# Patient Record
Sex: FEMALE | Race: WHITE | NOT HISPANIC OR LATINO | Employment: OTHER
[De-identification: names, ages, dates, MRNs, and addresses within clinical notes are randomized per-mention and may not be internally consistent; named-entity substitution may affect disease eponyms.]

---

## 2017-06-03 ENCOUNTER — HEALTH MAINTENANCE LETTER (OUTPATIENT)
Age: 47
End: 2017-06-03

## 2017-08-22 ENCOUNTER — OFFICE VISIT (OUTPATIENT)
Dept: FAMILY MEDICINE | Facility: CLINIC | Age: 47
End: 2017-08-22
Payer: COMMERCIAL

## 2017-08-22 VITALS
WEIGHT: 121.6 LBS | HEIGHT: 65 IN | OXYGEN SATURATION: 100 % | HEART RATE: 91 BPM | DIASTOLIC BLOOD PRESSURE: 90 MMHG | SYSTOLIC BLOOD PRESSURE: 146 MMHG | BODY MASS INDEX: 20.26 KG/M2 | TEMPERATURE: 97.6 F

## 2017-08-22 DIAGNOSIS — L65.9 HAIR LOSS: ICD-10-CM

## 2017-08-22 DIAGNOSIS — E03.4 HYPOTHYROIDISM DUE TO ACQUIRED ATROPHY OF THYROID: Primary | ICD-10-CM

## 2017-08-22 DIAGNOSIS — R03.0 ELEVATED BLOOD PRESSURE READING WITHOUT DIAGNOSIS OF HYPERTENSION: ICD-10-CM

## 2017-08-22 DIAGNOSIS — D50.0 IRON DEFICIENCY ANEMIA DUE TO CHRONIC BLOOD LOSS: ICD-10-CM

## 2017-08-22 LAB
ERYTHROCYTE [DISTWIDTH] IN BLOOD BY AUTOMATED COUNT: 11.8 % (ref 10–15)
HCT VFR BLD AUTO: 39.9 % (ref 35–47)
HGB BLD-MCNC: 13.5 G/DL (ref 11.7–15.7)
MCH RBC QN AUTO: 32.8 PG (ref 26.5–33)
MCHC RBC AUTO-ENTMCNC: 33.8 G/DL (ref 31.5–36.5)
MCV RBC AUTO: 97 FL (ref 78–100)
PLATELET # BLD AUTO: 244 10E9/L (ref 150–450)
RBC # BLD AUTO: 4.11 10E12/L (ref 3.8–5.2)
WBC # BLD AUTO: 5.2 10E9/L (ref 4–11)

## 2017-08-22 PROCEDURE — 36415 COLL VENOUS BLD VENIPUNCTURE: CPT | Performed by: INTERNAL MEDICINE

## 2017-08-22 PROCEDURE — 83540 ASSAY OF IRON: CPT | Performed by: INTERNAL MEDICINE

## 2017-08-22 PROCEDURE — 99203 OFFICE O/P NEW LOW 30 MIN: CPT | Performed by: INTERNAL MEDICINE

## 2017-08-22 PROCEDURE — 85027 COMPLETE CBC AUTOMATED: CPT | Performed by: INTERNAL MEDICINE

## 2017-08-22 PROCEDURE — 83550 IRON BINDING TEST: CPT | Performed by: INTERNAL MEDICINE

## 2017-08-22 RX ORDER — LEVOTHYROXINE SODIUM 137 UG/1
TABLET ORAL
Refills: 0 | COMMUNITY
Start: 2017-06-28 | End: 2023-02-23

## 2017-08-22 NOTE — MR AVS SNAPSHOT
"              After Visit Summary   8/22/2017    Rebekah Lacey    MRN: [id number]           Patient Information     Date Of Birth          1970        Visit Information        Provider Department      8/22/2017 2:30 PM Adrien Costello MD [facility]        Today's Diagnoses     Hypothyroidism due to acquired atrophy of thyroid    -  1    Hair loss        Elevated blood pressure reading without diagnosis of hypertension        Iron deficiency anemia due to chronic blood loss           Follow-ups after your visit        Your next 10 appointments already scheduled     Sep 22, 2017  1:20 PM CDT   PHYSICAL with Hawa Joiner MD   [facility]    [address] [address] [phone]              Who to contact     If you have questions or need follow up information about today's clinic visit or your schedule please contact [facility] directly at [phone].  Normal or non-critical lab and imaging results will be communicated to you by MyChart, letter or phone within 4 business days after the clinic has received the results. If you do not hear from us within 7 days, please contact the clinic through [employer] or phone. If you have a critical or abnormal lab result, we will notify you by phone as soon as possible.  Submit refill requests through [employer] or call your pharmacy and they will forward the refill request to us. Please allow 3 business days for your refill to be completed.          Additional Information About Your Visit        [employer] Information     [employer] lets you send messages to your doctor, view your test results, renew your prescriptions, schedule appointments and more. To sign up, go to [email or web address] . Click on \"Log in\" on the left side of the screen, which will take you to the Welcome page. Then click on \"Sign up Now\" on the right side of the page.     You will be asked to " "enter the access code listed below, as well as some personal information. Please follow the directions to create your username and password.     Your access code is: [email or web address]  Expires: 2017  2:25 PM     Your access code will  in 90 days. If you need help or a new code, please call your [facility] clinic or [phone].        Care EveryWhere ID     This is your Care EveryWhere ID. This could be used by other organizations to access your [facility] medical records  [id number]        Your Vitals Were     Pulse Temperature Height Last Period Pulse Oximetry Breastfeeding?    91 97.6  F (36.4  C) (Oral) 5' 5\" (1.651 m) 2017 100% No    BMI (Body Mass Index)                   20.24 kg/m2            Blood Pressure from Last 3 Encounters:   17 146/90    Weight from Last 3 Encounters:   17 121 lb 9.6 oz (55.2 kg)              We Performed the Following     CBC with platelets     Iron and iron binding capacity        Primary Care Provider Office Phone # Fax #    Adrine Hathaway [phone]       [facility] [address] [address]        Equal Access to Services     [facility]: Hadii aad ku hadasho Soomaali, waaxda luqadaha, qaybta kaalmada adeegyada, waxay idiin haygauravn leslie rivero . So [facility] [phone].    ATENCIÓN: Si habla español, tiene a medina disposición servicios gratuitos de asistencia lingüística. Llame al [phone].    We comply with applicable federal civil rights laws and [place] laws. We do not discriminate on the basis of race, color, national origin, age, disability sex, sexual orientation or gender identity.            Thank you!     Thank you for choosing [facility]  for your care. Our goal is always to provide you with excellent care. Hearing back from our patients is one way we can continue to improve our services. Please take a few minutes to complete the written survey that you may receive in the mail " after your visit with us. Thank you!             Your Updated Medication List - Protect others around you: Learn how to safely use, store and throw away your medicines at www.disposemymeds.org.          This list is accurate as of: 8/22/17 11:59 PM.  Always use your most recent med list.                   Brand Name Dispense Instructions for use Diagnosis    levothyroxine 137 MCG tablet    SYNTHROID/LEVOTHROID

## 2017-08-22 NOTE — NURSING NOTE
"Chief Complaint   Patient presents with     Establish Care       Initial BP (!) 152/97 (BP Location: Right arm, Patient Position: Chair, Cuff Size: Adult Regular)  Pulse 91  Temp 97.6  F (36.4  C) (Oral)  Ht 5' 5\" (1.651 m)  Wt 121 lb 9.6 oz (55.2 kg)  LMP 08/17/2017  SpO2 100%  Breastfeeding? No  BMI 20.24 kg/m2 Estimated body mass index is 20.24 kg/(m^2) as calculated from the following:    Height as of this encounter: 5' 5\" (1.651 m).    Weight as of this encounter: 121 lb 9.6 oz (55.2 kg).  Medication Reconciliation: complete.  Cyndi Harris CMA    "

## 2017-08-22 NOTE — PROGRESS NOTES
SUBJECTIVE:   Rebekah Lacey is a 47 year old female who is a former [facility] patient, presents to clinic today to establish care and for the following health issues:    Patient has history of hypothyroidism and complains of hair loss. She states one year ago her  noticed her hair was thinning so she saw her endocrinologist. At that appointment thyroid was low and medications were adjusted and symptoms improved. She states hair has begun thinning again. All tests were WNL at recent endocrinologist visit 2 weeks ago. In regards to exercises regimen, patient runs daily with her dog for 5 miles, approximately 50 minutes. Denies craving ice cubes.      Patient also complains of swollen anterior cervical lymph nodes bilaterally in the summer and marley. She reports using Claritin and Flonase when symptoms are severe, but does not use medications routinely. She notes nasal congestion during the day. Denies nasal congestion at night.    Patient does not have a history of hypertension, however, she notes an increase in pressure as her daughter is deciding between online classes and [facility].     Problem list and histories reviewed & adjusted, as indicated.  Additional history: as documented    Current Outpatient Prescriptions   Medication Sig Dispense Refill     levothyroxine (SYNTHROID/LEVOTHROID) 137 MCG tablet   0     Allergies   Allergen Reactions     Amoxicillin Hives     Clindamycin Hives       Reviewed and updated as needed this visit by clinical staff  Tobacco  Allergies  Meds  Soc Hx      Reviewed and updated as needed this visit by Provider         ROS:  Constitutional, HEENT, cardiovascular, pulmonary, gi and gu systems are negative, except as otherwise noted.    This document serves as a record of the services and decisions personally performed and made by Adrien Costello MD. It was created on his/her behalf by Sylvia Meng, a trained medical scribe. The creation of this  "document is based the provider's statements to the medical scribe.  Scribdez Meng 2:34 PM, August 22, 2017     OBJECTIVE:   BP (!) 150/96  Pulse 91  Temp 97.6  F (36.4  C) (Oral)  Ht 1.651 m (5' 5\")  Wt 55.2 kg (121 lb 9.6 oz)  LMP 08/17/2017  SpO2 100%  Breastfeeding? No  BMI 20.24 kg/m2  Body mass index is 20.24 kg/(m^2).   HEENT: throat was mildly erythematous   Neck was supple without adenopathy or thyromegaly her carotids were normal without bruits  Chest clear to auscultation and percussion  Cardiovascular S1 and S2 are physiologic without murmurs or gallops.  Abdomen bowel sounds were normal.  There is no palpable mass or organomegaly  Extremities nontender without any edema  Pulses pedal pulses are as described otherwise his pulses are bilaterally symmetrical throughout without bruits  Skin without significant abnormality     ASSESSMENT/PLAN:   1. Hypothyroidism due to acquired atrophy of thyroid  See #2    2. Hair loss  Labs recently done at endocrinologist.    3. Elevated blood pressure reading without diagnosis of hypertension  Current elevated blood pressure due to increased acute stress. Monitor blood pressure BID for the next week and report findings.  4. Iron deficiency anemia due to chronic blood loss  Eliminating iron deficiency as cause of hair loss  - CBC with platelets  - Iron and iron binding capacity    Being Flonase 2 sprays each nostril QD in the AM.     Adrien Costello MD  [facility]    The information in this document, created by the medical scribe for me, accurately reflects the services I personally performed and the decisions made by me. I have reviewed and approved this document for accuracy prior to leaving the patient care area.  Adrien Costello MD  2:33 PM, 08/22/17     "

## 2017-08-23 LAB
IRON SATN MFR SERPL: 32 % (ref 15–46)
IRON SERPL-MCNC: 125 UG/DL (ref 35–180)
TIBC SERPL-MCNC: 391 UG/DL (ref 240–430)

## 2017-08-24 NOTE — PROGRESS NOTES
I called the patient and informed of results. The following changes were made to treatment:   Recent thyroid evaluation was normal, blood cell counts, and iron stores are also normal.  The possibilities related to her perimenopausal hair loss would be related to her underlying menopausal state and associated hair loss and woman versus traction alopecia or telogen  Effluvium associated with her recent stress.  Inquired about recent blood pressure checks which she is beginning to do.  We'll reevaluate here in 3 months depending.    Patient voices understanding and will contact me with any further questions.     Adrien Costello MD

## 2017-09-16 ENCOUNTER — HEALTH MAINTENANCE LETTER (OUTPATIENT)
Age: 47
End: 2017-09-16

## 2017-11-29 ENCOUNTER — OFFICE VISIT (OUTPATIENT)
Dept: OBGYN | Facility: CLINIC | Age: 47
End: 2017-11-29
Payer: COMMERCIAL

## 2017-11-29 ENCOUNTER — RADIANT APPOINTMENT (OUTPATIENT)
Dept: MAMMOGRAPHY | Facility: CLINIC | Age: 47
End: 2017-11-29
Payer: COMMERCIAL

## 2017-11-29 VITALS
BODY MASS INDEX: 21.09 KG/M2 | WEIGHT: 126.6 LBS | SYSTOLIC BLOOD PRESSURE: 124 MMHG | DIASTOLIC BLOOD PRESSURE: 82 MMHG | HEART RATE: 72 BPM | HEIGHT: 65 IN

## 2017-11-29 DIAGNOSIS — E03.4 HYPOTHYROIDISM DUE TO ACQUIRED ATROPHY OF THYROID: ICD-10-CM

## 2017-11-29 DIAGNOSIS — Z01.419 ENCOUNTER FOR GYNECOLOGICAL EXAMINATION WITHOUT ABNORMAL FINDING: Primary | ICD-10-CM

## 2017-11-29 DIAGNOSIS — N60.12 FIBROCYSTIC CHANGES OF LEFT BREAST: ICD-10-CM

## 2017-11-29 DIAGNOSIS — Z12.31 VISIT FOR SCREENING MAMMOGRAM: ICD-10-CM

## 2017-11-29 DIAGNOSIS — Z23 NEED FOR PROPHYLACTIC VACCINATION AND INOCULATION AGAINST INFLUENZA: ICD-10-CM

## 2017-11-29 PROCEDURE — 87624 HPV HI-RISK TYP POOLED RSLT: CPT | Performed by: OBSTETRICS & GYNECOLOGY

## 2017-11-29 PROCEDURE — 90686 IIV4 VACC NO PRSV 0.5 ML IM: CPT | Performed by: OBSTETRICS & GYNECOLOGY

## 2017-11-29 PROCEDURE — 90471 IMMUNIZATION ADMIN: CPT | Performed by: OBSTETRICS & GYNECOLOGY

## 2017-11-29 PROCEDURE — 99386 PREV VISIT NEW AGE 40-64: CPT | Mod: 25 | Performed by: OBSTETRICS & GYNECOLOGY

## 2017-11-29 PROCEDURE — G0202 SCR MAMMO BI INCL CAD: HCPCS | Mod: TC

## 2017-11-29 PROCEDURE — G0145 SCR C/V CYTO,THINLAYER,RESCR: HCPCS | Performed by: OBSTETRICS & GYNECOLOGY

## 2017-11-29 ASSESSMENT — PATIENT HEALTH QUESTIONNAIRE - PHQ9
5. POOR APPETITE OR OVEREATING: NOT AT ALL
SUM OF ALL RESPONSES TO PHQ QUESTIONS 1-9: 0

## 2017-11-29 ASSESSMENT — ANXIETY QUESTIONNAIRES
2. NOT BEING ABLE TO STOP OR CONTROL WORRYING: NOT AT ALL
GAD7 TOTAL SCORE: 0
1. FEELING NERVOUS, ANXIOUS, OR ON EDGE: NOT AT ALL
5. BEING SO RESTLESS THAT IT IS HARD TO SIT STILL: NOT AT ALL
3. WORRYING TOO MUCH ABOUT DIFFERENT THINGS: NOT AT ALL
7. FEELING AFRAID AS IF SOMETHING AWFUL MIGHT HAPPEN: NOT AT ALL
6. BECOMING EASILY ANNOYED OR IRRITABLE: NOT AT ALL
IF YOU CHECKED OFF ANY PROBLEMS ON THIS QUESTIONNAIRE, HOW DIFFICULT HAVE THESE PROBLEMS MADE IT FOR YOU TO DO YOUR WORK, TAKE CARE OF THINGS AT HOME, OR GET ALONG WITH OTHER PEOPLE: NOT DIFFICULT AT ALL

## 2017-11-29 NOTE — MR AVS SNAPSHOT
After Visit Summary   11/29/2017    Rebekah Lacey    MRN: [id number]           Patient Information     Date Of Birth          1970        Visit Information        Provider Department      11/29/2017 8:40 AM Hawa Joiner MD [facility]        Today's Diagnoses     Encounter for gynecological examination without abnormal finding    -  1    Hypothyroidism due to acquired atrophy of thyroid        Fibrocystic changes of left breast        Need for prophylactic vaccination and inoculation against influenza           Follow-ups after your visit        Who to contact     If you have questions or need follow up information about today's clinic visit or your schedule please contact [facility] directly at [phone].  Normal or non-critical lab and imaging results will be communicated to you by MyChart, letter or phone within 4 business days after the clinic has received the results. If you do not hear from us within 7 days, please contact the clinic through [employer] or phone. If you have a critical or abnormal lab result, we will notify you by phone as soon as possible.  Submit refill requests through [employer] [facility] [facility] or call your pharmacy and they will forward the refill request to us. Please allow 3 business days for your refill to be completed.          Additional Information About Your Visit        MyChart Information     [facility] [employer] [facility] gives you secure access to your electronic health record. If you see a primary care provider, you can also send messages to your care team and make appointments. If you have questions, please call your primary care clinic.  If you do not have a primary care provider, please call [phone] and they will assist you.        Care EveryWhere ID     This is your Care EveryWhere ID. This could be used by other organizations to access your [facility] medical records  [id number]        Your Vitals Were     Pulse Height Last Period BMI (Body  "Mass Index)          72 5' 5\" (1.651 m) 11/20/2017 (Exact Date) 21.07 kg/m2         Blood Pressure from Last 3 Encounters:   11/29/17 124/82   08/22/17 146/90    Weight from Last 3 Encounters:   11/29/17 126 lb 9.6 oz (57.4 kg)   08/22/17 121 lb 9.6 oz (55.2 kg)              We Performed the Following     FLU VAC, SPLIT VIRUS IM > 3 YO (QUADRIVALENT) [23199]     HPV High Risk Types DNA Cervical     Pap imaged thin layer screen with HPV - recommended age 30 - 65     Vaccine Administration, Initial [23429]        Primary Care Provider Office Phone # Fax #    Adrien Costello [phone] [address] [address]        Equal Access to Services     [facility] : Hadii aline locke hadasho Soomaali, waaxda luqadaha, qaybta kaalmada calos, elvin rivero . So [facility] [phone].    ATENCIÓN: Si habla español, tiene a medina disposición servicios gratuitos de asistencia lingüística. Brian al [phone].    We comply with applicable federal civil rights laws and [place] laws. We do not discriminate on the basis of race, color, national origin, age, disability, sex, sexual orientation, or gender identity.            Thank you!     Thank you for choosing [facility]  for your care. Our goal is always to provide you with excellent care. Hearing back from our patients is one way we can continue to improve our services. Please take a few minutes to complete the written survey that you may receive in the mail after your visit with us. Thank you!             Your Updated Medication List - Protect others around you: Learn how to safely use, store and throw away your medicines at www.disposemymeds.org.          This list is accurate as of: 11/29/17 11:59 PM.  Always use your most recent med list.                   Brand Name Dispense Instructions for use Diagnosis    CLARITIN PO           FLONASE ALLERGY RELIEF NA           levothyroxine 137 MCG " tablet    SYNTHROID/LEVOTHROID

## 2017-11-29 NOTE — PROGRESS NOTES

## 2017-11-29 NOTE — PROGRESS NOTES
Rebekah is a 47 year old No obstetric history on file. female who presents for annual exam.     Besides routine health maintenance, she has no other health concerns today .    HPI:  The patient's PCP is Adrien Costello MD.    Was seeing various ppl at [facility] ob/gyn for years and now hasn't been seen since 2014. Her PCP is part of  so decided to just get an ob/gyn through  as well to keep records more straight forward.  Had graves, ablation and now hypothyroid. Sees Dr. Rinaldi for that. Last year had a small bump in her dose but o/w has been fairly stable for years and not sx.  Periods are very regular for most part. Skipped dec 2016 but was traveling and stressed with dad's surgery. Then was 2 weeks late on her last one in November but again were traveling. No vasomotor or other perimenopausal sx at all.  Periods are about 4-5 days. 2 are heavy. Some clots, some waking at night. Changes tampon every 2 hours. Rare accidents. Tolerable. Older sis is 55 and just starting to have some sx.  Her  feels she's losing some hair and she feels it's definitely thinner too. Not a recent change but generally in the last few years. No spots of baldness just generally more thin.  Has a 13 and 11 y.o daughters and a 5 y.o son. Works for [employer].      GYNECOLOGIC HISTORY:    Patient's last menstrual period was 11/20/2017 (exact date).  Her current contraception method is: vasectomy.  She  reports that she has never smoked. She has never used smokeless tobacco.      Patient is sexually active.  STD testing offered?  Declined  Last PHQ-9 score on record =   PHQ-9 SCORE 11/29/2017   Total Score 0     Last GAD7 score on record =   JACQUELINE-7 SCORE 11/29/2017   Total Score 0     Alcohol Score = 1    HEALTH MAINTENANCE:  Cholesterol: (No results found for: CHOL -patient has done with pcp  Last Mammo: per patient 2014, Result: normal, Next Mammo: patient will schedule an apppointment   Pap: per patient 2014, wnl-unsure if had  "HPV  Colonoscopy:  NA, not due until age 49  Dexa: Never    Health maintenance updated:  yes    HISTORY:  Obstetric History       T3      L0     SAB1   TAB0   Ectopic0   Multiple0   Live Births0       # Outcome Date GA Lbr Samym/2nd Weight Sex Delivery Anes PTL Lv   4 Term 01/01/10    M       3 Term 06    F       2 Term 04    F       1 SAB                   Patient Active Problem List   Diagnosis     Hypothyroidism due to acquired atrophy of thyroid     Fibrocystic changes of left breast     Past Surgical History:   Procedure Laterality Date     breast lump removal       DILATION AND CURETTAGE  2011    x2      Social History   Substance Use Topics     Smoking status: Never Smoker     Smokeless tobacco: Never Used     Alcohol use Yes      Problem (# of Occurrences) Relation (Name,Age of Onset)    HEART DISEASE (1) Father    Hyperlipidemia (2) Mother, Father    Hypertension (3) Mother, Father, Brother            Current Outpatient Prescriptions   Medication Sig     Loratadine (CLARITIN PO)      Fluticasone Propionate (FLONASE ALLERGY RELIEF NA)      levothyroxine (SYNTHROID/LEVOTHROID) 137 MCG tablet      No current facility-administered medications for this visit.      Allergies   Allergen Reactions     Amoxicillin Hives     Clindamycin Hives       Past medical, surgical, social and family histories were reviewed and updated in EPIC.    ROS:   12 point review of systems negative other than symptoms noted below.  Endocrine: Loss of Hair    EXAM:  /82  Pulse 72  Ht 5' 5\" (1.651 m)  Wt 126 lb 9.6 oz (57.4 kg)  LMP 2017 (Exact Date)  BMI 21.07 kg/m2   BMI: Body mass index is 21.07 kg/(m^2).    PHYSICAL EXAM:  Constitutional:  Appearance: Well nourished, well developed, alert, in no acute distress  Neck:  Lymph Nodes:  No lymphadenopathy present    Thyroid:  Gland size normal, nontender, no nodules or masses present  on palpation  Chest:  Respiratory Effort:  " Breathing unlabored  Cardiovascular:    Heart: Auscultation:  Regular rate, normal rhythm, no murmurs present  Breasts: NORMAL RIGHT BREAST. THE LEFT BREAST IN THE UPPER OUTER QUADRANT HAS EXTENSIVE F.C CHANGES AND DENSITY THOUGH NO SPECIFIC LUMP  Gastrointestinal:   Abdominal Examination:  Abdomen nontender to palpation, tone normal without rigidity or guarding, no masses present, umbilicus without lesions   Liver and Spleen:  No hepatomegaly present, liver nontender to palpation    Hernias:  No hernias present  Lymphatic: Lymph Nodes:  No other lymphadenopathy present  Skin:  General Inspection:  No rashes present, no lesions present, no areas of  discoloration    Genitalia and Groin:  No rashes present, no lesions present, no areas of  discoloration, no masses present  Neurologic/Psychiatric:    Mental Status:  Oriented X3     Pelvic Exam:  External Genitalia:     Normal appearance for age, no discharge present, no tenderness present, no inflammatory lesions present, color normal  Vagina:     Normal vaginal vault without central or paravaginal defects, no discharge present, no inflammatory lesions present, no masses present  Bladder:     Nontender to palpation  Urethra:   Urethral Body:  Urethra palpation normal, urethra structural support normal   Urethral Meatus:  No erythema or lesions present  Cervix:     Appearance healthy, no lesions present, nontender to palpation, no bleeding present  Uterus:     Uterus: firm, normal sized and nontender, anteverted in position.   Adnexa:     No adnexal tenderness present, no adnexal masses present  Perineum:     Perineum within normal limits, no evidence of trauma, no rashes or skin lesions present  Anus:     Anus within normal limits, no hemorrhoids present  Inguinal Lymph Nodes:     No lymphadenopathy present  Pubic Hair:     Normal pubic hair distribution for age  Genitalia and Groin:     No rashes present, no lesions present, no areas of discoloration, no masses  present      COUNSELING:   Reviewed preventive health counseling, as reflected in patient instructions    BMI: Body mass index is 21.07 kg/(m^2).        ASSESSMENT:  47 year old female with satisfactory annual exam.    ICD-10-CM    1. Encounter for gynecological examination without abnormal finding Z01.419 Pap imaged thin layer screen with HPV - recommended age 30 - 65     HPV High Risk Types DNA Cervical   2. Hypothyroidism due to acquired atrophy of thyroid E03.4    3. Fibrocystic changes of left breast N60.12    4. Need for prophylactic vaccination and inoculation against influenza Z23 FLU VAC, SPLIT VIRUS IM > 3 YO (QUADRIVALENT) [90440]     Vaccine Administration, Initial [53892]       PLAN:  Pap and cotesting done today as last pap was 3 yrs ago and no records available so HPV status not certain  Continue to follow her thyroid with Dr. Rinaldi of [facility]  Doing her labs and primary care f/u with P[facility] who is now apparently part of the [facility] system. No recent fasting labs in system but patient states she's had them recently  Patient had a benign breast lump removed from the left in her late 20's. Can't remember what it was but excisional and not drainage. Still has a lot of nodularity in the upper left side. Able to get her in for a mammo today and encouraged to do a 3D. If that is normal no further follow up needed but could consider u/s of that side in the future if any changes  Flu shot today  Patient has heavy periods for 2 days or so. Discussed lysteda if event/travel/etc. Patient is open to idea and will call if/when would like to try an rx    Hawa Joiner MD

## 2017-11-29 NOTE — LETTER
December 5, 2017    Rebekah Lacey  [address]  [place] [facility] [address]    Dear Rebekah,  We are happy to inform you that your PAP smear result from 11/29/17 is normal.  We are now able to do a follow up test on PAP smears. The DNA test is for HPV (Human Papilloma Virus). Cervical cancer is closely linked with certain types of HPV. Your result showed no evidence of high risk HPV.  Therefore we recommend you return in 3 years for your next pap smear.  You will still need to return to the clinic every year for an annual exam and other preventive tests.  Please contact the clinic at [phone] with any questions.  Sincerely,    Hawa Joiner MD/roxana

## 2017-11-30 ASSESSMENT — ANXIETY QUESTIONNAIRES: GAD7 TOTAL SCORE: 0

## 2017-12-01 LAB
COPATH REPORT: NORMAL
PAP: NORMAL

## 2017-12-04 LAB
FINAL DIAGNOSIS: NORMAL
HPV HR 12 DNA CVX QL NAA+PROBE: NEGATIVE
HPV16 DNA SPEC QL NAA+PROBE: NEGATIVE
HPV18 DNA SPEC QL NAA+PROBE: NEGATIVE
SPECIMEN DESCRIPTION: NORMAL

## 2018-10-23 ENCOUNTER — OFFICE VISIT (OUTPATIENT)
Dept: FAMILY MEDICINE | Facility: CLINIC | Age: 48
End: 2018-10-23
Payer: COMMERCIAL

## 2018-10-23 VITALS
BODY MASS INDEX: 21.33 KG/M2 | DIASTOLIC BLOOD PRESSURE: 92 MMHG | OXYGEN SATURATION: 100 % | SYSTOLIC BLOOD PRESSURE: 140 MMHG | TEMPERATURE: 98.6 F | HEIGHT: 65 IN | HEART RATE: 72 BPM | WEIGHT: 128 LBS

## 2018-10-23 DIAGNOSIS — R05.8 POST-VIRAL COUGH SYNDROME: Primary | ICD-10-CM

## 2018-10-23 PROCEDURE — 99213 OFFICE O/P EST LOW 20 MIN: CPT | Performed by: NURSE PRACTITIONER

## 2018-10-23 NOTE — MR AVS SNAPSHOT
"              After Visit Summary   10/23/2018    Rebekah Lacey    MRN: [id number]           Patient Information     Date Of Birth          1970        Visit Information        Provider Department      10/23/2018 1:00 PM Марина Conway APRN CNP [facility]        Care Instructions    Push fluids  Begin mucinex 1200mg twice a day   And continue the flonase 2 sprays each nostril once a day          Follow-ups after your visit        Who to contact     If you have questions or need follow up information about today's clinic visit or your schedule please contact [facility] directly at [phone].  Normal or non-critical lab and imaging results will be communicated to you by MyChart, letter or phone within 4 business days after the clinic has received the results. If you do not hear from us within 7 days, please contact the clinic through [employer] or phone. If you have a critical or abnormal lab result, we will notify you by phone as soon as possible.  Submit refill requests through [employer] or call your pharmacy and they will forward the refill request to us. Please allow 3 business days for your refill to be completed.          Additional Information About Your Visit        MyChart Information     [employer] gives you secure access to your electronic health record. If you see a primary care provider, you can also send messages to your care team and make appointments. If you have questions, please call your primary care clinic.  If you do not have a primary care provider, please call [phone] and they will assist you.        Care EveryWhere ID     This is your Care EveryWhere ID. This could be used by other organizations to access your [facility] medical records  [id number]        Your Vitals Were     Pulse Temperature Height Pulse Oximetry Breastfeeding? BMI (Body Mass Index)    72 98.6  F (37  C) (Oral) 5' 5\" (1.651 m) 100% No 21.3 kg/m2       Blood Pressure from Last 3 Encounters: "   10/23/18 (!) 140/92   11/29/17 124/82   08/22/17 146/90    Weight from Last 3 Encounters:   10/23/18 128 lb (58.1 kg)   11/29/17 126 lb 9.6 oz (57.4 kg)   08/22/17 121 lb 9.6 oz (55.2 kg)              Today, you had the following     No orders found for display       Primary Care Provider Office Phone # Fax #    Adrien Costello [phone] [address] [facility] [address]  [facility] [address]        Equal Access to Services     [facility]: Hadii aad ku hadasho Soelizabethali, waaxda luqadaha, qaybta kaalmada adelorettayada, elvin rivero . So [facility] [phone].    ATENCIÓN: Si habla español, tiene a medina disposición servicios gratuitos de asistencia lingüística. Llame al [phone].    We comply with applicable federal civil rights laws and [place] laws. We do not discriminate on the basis of race, color, national origin, age, disability, sex, sexual orientation, or gender identity.            Thank you!     Thank you for choosing [facility]  for your care. Our goal is always to provide you with excellent care. Hearing back from our patients is one way we can continue to improve our services. Please take a few minutes to complete the written survey that you may receive in the mail after your visit with us. Thank you!             Your Updated Medication List - Protect others around you: Learn how to safely use, store and throw away your medicines at www.disposemymeds.org.          This list is accurate as of 10/23/18  1:23 PM.  Always use your most recent med list.                   Brand Name Dispense Instructions for use Diagnosis    CLARITIN PO           FLONASE ALLERGY RELIEF NA           levothyroxine 137 MCG tablet    SYNTHROID/LEVOTHROID

## 2018-10-23 NOTE — PATIENT INSTRUCTIONS
Push fluids  Begin mucinex 1200mg twice a day   And continue the flonase 2 sprays each nostril once a day

## 2018-10-23 NOTE — NURSING NOTE
"Chief Complaint   Patient presents with     Cough       Initial Pulse 72  Temp 98.6  F (37  C) (Oral)  Ht 5' 5\" (1.651 m)  Wt 128 lb (58.1 kg)  SpO2 100%  Breastfeeding? No  BMI 21.3 kg/m2 Estimated body mass index is 21.3 kg/(m^2) as calculated from the following:    Height as of this encounter: 5' 5\" (1.651 m).    Weight as of this encounter: 128 lb (58.1 kg).  BP completed using cuff size: regular    Health Maintenance Due   Topic Date Due     HIV SCREEN (SYSTEM ASSIGNED)  03/09/1987     LIPID SCREEN Q5 YR FEMALE (SYSTEM ASSIGNED)  03/09/2014     TETANUS IMMUNIZATION (SYSTEM ASSIGNED)  01/01/2015     INFLUENZA VACCINE (1) 09/01/2018         Shannan Blanc MA 10/23/18        "

## 2018-10-23 NOTE — PROGRESS NOTES
SUBJECTIVE:   Rebekah Lacey is a 48 year old female who presents to clinic today for the following health issues:      Acute Illness   Acute illness concerns: cough   Onset: x 3 weeks but getting better;  Cough a bit worse this morning so  sent her in to get checked    Fever: no    Chills/Sweats: no    Headache (location?): no     Sinus Pressure:no    Conjunctivitis:  No, seasonal allergies.     Ear Pain: no    Rhinorrhea: YES    Congestion: no    Sore Throat: YES- due to coughing.      Cough: YES-productive of yellow sputum    Wheeze: YES- in the mornings.     Decreased Appetite: no    Nausea: no    Vomiting: no    Diarrhea:  no    Dysuria/Freq.: no    Fatigue/Achiness: no     Sick/Strep Exposure: no      Therapies Tried and outcome: Honey, post nasal drip.   Just started using some flonase 4 days ago    Problem list and histories reviewed & adjusted, as indicated.  Additional history: as documented    Patient Active Problem List   Diagnosis     Hypothyroidism due to acquired atrophy of thyroid     Fibrocystic changes of left breast     Past Surgical History:   Procedure Laterality Date     breast lump removal  1997     DILATION AND CURETTAGE  2011    x2       Social History   Substance Use Topics     Smoking status: Never Smoker     Smokeless tobacco: Never Used     Alcohol use Yes     Family History   Problem Relation Age of Onset     Hyperlipidemia Mother      Hypertension Mother      Hyperlipidemia Father      Hypertension Father      HEART DISEASE Father      Hypertension Brother          Current Outpatient Prescriptions   Medication Sig Dispense Refill     Fluticasone Propionate (FLONASE ALLERGY RELIEF NA)        levothyroxine (SYNTHROID/LEVOTHROID) 137 MCG tablet   0     Loratadine (CLARITIN PO)        Allergies   Allergen Reactions     Amoxicillin Hives     Clindamycin Hives       Reviewed and updated as needed this visit by clinical staff       Reviewed and updated as needed this visit by  "Provider         ROS:  Constitutional, HEENT, cardiovascular, pulmonary, gi and gu systems are negative, except as otherwise noted.    OBJECTIVE:     BP (!) 140/92  Pulse 72  Temp 98.6  F (37  C) (Oral)  Ht 5' 5\" (1.651 m)  Wt 128 lb (58.1 kg)  SpO2 100%  Breastfeeding? No  BMI 21.3 kg/m2  Body mass index is 21.3 kg/(m^2).  GENERAL: healthy, alert and no distress  EYES: Eyes grossly normal to inspection, PERRL and conjunctivae and sclerae normal  HENT: ear canals and TM's normal, nose and mouth without ulcers or lesions  NECK: no adenopathy, no asymmetry, masses, or scars and thyroid normal to palpation  RESP: lungs clear to auscultation - no rales, rhonchi or wheezes  CV: regular rate and rhythm, normal S1 S2, no S3 or S4, no murmur, click or rub, no peripheral edema and peripheral pulses strong    Diagnostic Test Results:  none     ASSESSMENT/PLAN:         ICD-10-CM    1. Post-viral cough syndrome R05        Patient Instructions   Push fluids  Begin mucinex 1200mg twice a day   And continue the flonase 2 sprays each nostril once a day      JOSÉ [facility]  "

## 2018-12-18 ENCOUNTER — OFFICE VISIT (OUTPATIENT)
Dept: FAMILY MEDICINE | Facility: CLINIC | Age: 48
End: 2018-12-18
Payer: COMMERCIAL

## 2018-12-18 VITALS
HEART RATE: 72 BPM | OXYGEN SATURATION: 100 % | BODY MASS INDEX: 20.83 KG/M2 | SYSTOLIC BLOOD PRESSURE: 146 MMHG | DIASTOLIC BLOOD PRESSURE: 96 MMHG | WEIGHT: 125 LBS | HEIGHT: 65 IN | TEMPERATURE: 97.8 F

## 2018-12-18 DIAGNOSIS — Z00.00 ROUTINE GENERAL MEDICAL EXAMINATION AT A HEALTH CARE FACILITY: ICD-10-CM

## 2018-12-18 DIAGNOSIS — Z23 NEED FOR PROPHYLACTIC VACCINATION AND INOCULATION AGAINST INFLUENZA: ICD-10-CM

## 2018-12-18 DIAGNOSIS — N60.12 FIBROCYSTIC CHANGES OF LEFT BREAST: ICD-10-CM

## 2018-12-18 DIAGNOSIS — I10 ESSENTIAL HYPERTENSION: ICD-10-CM

## 2018-12-18 DIAGNOSIS — E03.4 HYPOTHYROIDISM DUE TO ACQUIRED ATROPHY OF THYROID: Primary | ICD-10-CM

## 2018-12-18 DIAGNOSIS — Z23 NEED FOR TD VACCINE: ICD-10-CM

## 2018-12-18 DIAGNOSIS — Z23 NEED FOR INFLUENZA VACCINATION: ICD-10-CM

## 2018-12-18 PROCEDURE — 99396 PREV VISIT EST AGE 40-64: CPT | Mod: 25 | Performed by: INTERNAL MEDICINE

## 2018-12-18 PROCEDURE — 90714 TD VACC NO PRESV 7 YRS+ IM: CPT | Performed by: INTERNAL MEDICINE

## 2018-12-18 PROCEDURE — 90472 IMMUNIZATION ADMIN EACH ADD: CPT | Performed by: INTERNAL MEDICINE

## 2018-12-18 PROCEDURE — 90686 IIV4 VACC NO PRSV 0.5 ML IM: CPT | Performed by: INTERNAL MEDICINE

## 2018-12-18 PROCEDURE — 90471 IMMUNIZATION ADMIN: CPT | Performed by: INTERNAL MEDICINE

## 2018-12-18 RX ORDER — LISINOPRIL 10 MG/1
10 TABLET ORAL DAILY
Qty: 30 TABLET | Refills: 1 | Status: SHIPPED | OUTPATIENT
Start: 2018-12-18 | End: 2019-01-28

## 2018-12-18 ASSESSMENT — MIFFLIN-ST. JEOR: SCORE: 1192.88

## 2018-12-18 NOTE — PROGRESS NOTES
SUBJECTIVE:   Rebekah Lacey is a 48 year old female who presents to clinic today for the following health issues:    Patient presents to clinic for follow up to discuss her respiratory sx's that are still lingering with a cough in the morning particularly. She uses Flonase 2 sprays into each nostril BID. She is allergic to Mucinex.     Patient has been checking her BP and it has been 140/90. She is wondering if this is of concern. She exercises and watches her salt intake. She runs 5 miles a day. If she doesn't run, then she either does stairs, elliptical, or bike. Her weight has stayed relatively consistent. She has a 12 year old and 6 year old child. Her children frequently keep her up at night. She does not feel well rested when she wakes up. She drinks 2 cups of coffee a day. Her stomach does not swell.     She takes 137 mcg levothyroxine once daily. She is due for TSH. Last TSH done 3 months ago was normal. Her last tetanus was done in 2005.    Problem list and histories reviewed & adjusted, as indicated.  Additional history    Current Outpatient Medications   Medication Sig Dispense Refill     Fluticasone Propionate (FLONASE ALLERGY RELIEF NA)        levothyroxine (SYNTHROID/LEVOTHROID) 137 MCG tablet   0     Loratadine (CLARITIN PO)        Allergies   Allergen Reactions     Amoxicillin Hives     Clindamycin Hives     Mucinex Child Multi-Symptom Rash     Reviewed and updated as needed this visit by clinical staff       Reviewed and updated as needed this visit by Provider       ROS:  Constitutional, HEENT, cardiovascular, pulmonary, gi and gu systems are negative, except as otherwise noted.    This document serves as a record of the services and decisions personally performed and made by Adrien Costello MD. It was created on his behalf by Ana Luisa Viera, a trained medical scribe. The creation of this document is based on the provider's statements to the medical scribe.  Ana Luisa Viera 10:48 AM December 18,  "2018  OBJECTIVE:   BP (!) 152/93 (BP Location: Left arm, Patient Position: Sitting, Cuff Size: Adult Regular)   Pulse 72   Temp 97.8  F (36.6  C) (Oral)   Ht 1.651 m (5' 5\")   Wt 56.7 kg (125 lb)   LMP 11/06/2018 (Approximate)   SpO2 100%   Breastfeeding? No   BMI 20.80 kg/m    Body mass index is 20.8 kg/m .     Neck was supple without adenopathy or thyromegaly her carotids were normal without bruits, streaking of posterior pharynx  Chest clear to auscultation and percussion  Cardiovascular S1 and S2 are physiologic without murmurs or gallops  Abdomen bowel sounds were normal.  There is no palpable mass or organomegaly  Extremities nontender without any edema  Pulses pedal pulses are as described otherwise his pulses are bilaterally symmetrical throughout without bruits  Skin without significant abnormality    Diagnostic Test Results:  none   ASSESSMENT/PLAN:   1. Hypothyroidism due to acquired atrophy of thyroid  Managed with 137 mcg levothyroxine once daily.    2. Fibrocystic changes of left breast    3. Routine general medical examination at a health care facility  Flu shot administered in office today. TD administered in office.   - TD PRESERV FREE, IM (7+ YRS)  - FLU VAC, QUADRIVALENT (RIV4) RECOMBINANT DNA, IM  - lisinopril (PRINIVIL/ZESTRIL) 10 MG tablet; Take 1 tablet (10 mg) by mouth daily  Dispense: 30 tablet; Refill: 1    4. Essential hypertension  BP rechecked in office. BP was 146/96 in office. Discussed medication options to regulate BP. Discussed SE of lisinopril. Start 10 mg lisinopril once daily. If experiencing dry cough, will discuss switching to new medication.   - lisinopril (PRINIVIL/ZESTRIL) 10 MG tablet; Take 1 tablet (10 mg) by mouth daily  Dispense: 30 tablet; Refill: 1    BP Readings from Last 3 Encounters:   12/18/18 (!) 146/96   10/23/18 (!) 140/92   11/29/17 124/82     Follow up in 1-2 weeks to recheck BP and check potassium.    The information in this document, created by the " medical scribe for me, accurately reflects the services I personally performed and the decisions made by me. I have reviewed and approved this document for accuracy prior to leaving the patient care area.  December 18, 2018 11:06 AM    Adrien Costello MD  [facility] [employer] [facility] [employer]    Injectable Influenza Immunization Documentation    1.  Is the person to be vaccinated sick today?   No    2. Does the person to be vaccinated have an allergy to a component   of the vaccine?   No  Egg Allergy Algorithm Link    3. Has the person to be vaccinated ever had a serious reaction   to influenza vaccine in the past?   No    4. Has the person to be vaccinated ever had Guillain-Barré syndrome?   No    Form completed by patient answered  Tayo Leonard CMA on 12/18/2018 at 11:24 AM

## 2018-12-18 NOTE — NURSING NOTE
Screening Questionnaire for Adult Immunization    Are you sick today?   Yes   Do you have allergies to medications, food, a vaccine component or latex?   No   Have you ever had a serious reaction after receiving a vaccination?   No   Do you have a long-term health problem with heart disease, lung disease, asthma, kidney disease, metabolic disease (e.g. diabetes), anemia, or other blood disorder?   No   Do you have cancer, leukemia, HIV/AIDS, or any other immune system problem?   No   In the past 3 months, have you taken medications that affect  your immune system, such as prednisone, other steroids, or anticancer drugs; drugs for the treatment of rheumatoid arthritis, Crohn s disease, or psoriasis; or have you had radiation treatments?   No   Have you had a seizure, or a brain or other nervous system problem?   No   During the past year, have you received a transfusion of blood or blood     products, or been given immune (gamma) globulin or antiviral drug?   No   For women: Are you pregnant or is there a chance you could become        pregnant during the next month?   No   Have you received any vaccinations in the past 4 weeks?   No     Immunization questionnaire answers were all negative.        Per orders of Dr. Costello, injection of Td given by Tayo Leonard. Patient instructed to remain in clinic for 15 minutes afterwards, and to report any adverse reaction to me immediately.  Prior to injection, verified patient identity using patient's name and date of birth.  Due to injection administration, patient instructed to remain in clinic for 15 minutes  afterwards, and to report any adverse reaction to me immediately.    Td vaccination    Drug Amount Wasted:  None.  Vial/Syringe: Single dose vial     patient answered  Screening performed by Tayo Leonard on 12/18/2018 at 11:26 AM.  Tayo Leonard CMA on 12/18/2018 at 11:27 AM

## 2019-01-28 ENCOUNTER — OFFICE VISIT (OUTPATIENT)
Dept: FAMILY MEDICINE | Facility: CLINIC | Age: 49
End: 2019-01-28
Payer: COMMERCIAL

## 2019-01-28 VITALS
SYSTOLIC BLOOD PRESSURE: 134 MMHG | OXYGEN SATURATION: 99 % | HEIGHT: 65 IN | DIASTOLIC BLOOD PRESSURE: 91 MMHG | TEMPERATURE: 98.3 F | BODY MASS INDEX: 20.98 KG/M2 | HEART RATE: 86 BPM | WEIGHT: 125.9 LBS

## 2019-01-28 DIAGNOSIS — E03.4 HYPOTHYROIDISM DUE TO ACQUIRED ATROPHY OF THYROID: ICD-10-CM

## 2019-01-28 DIAGNOSIS — I10 ESSENTIAL HYPERTENSION: Primary | ICD-10-CM

## 2019-01-28 DIAGNOSIS — Z00.00 ROUTINE GENERAL MEDICAL EXAMINATION AT A HEALTH CARE FACILITY: ICD-10-CM

## 2019-01-28 PROCEDURE — 99214 OFFICE O/P EST MOD 30 MIN: CPT | Performed by: INTERNAL MEDICINE

## 2019-01-28 PROCEDURE — 84443 ASSAY THYROID STIM HORMONE: CPT | Performed by: INTERNAL MEDICINE

## 2019-01-28 PROCEDURE — 80048 BASIC METABOLIC PNL TOTAL CA: CPT | Performed by: INTERNAL MEDICINE

## 2019-01-28 PROCEDURE — 36415 COLL VENOUS BLD VENIPUNCTURE: CPT | Performed by: INTERNAL MEDICINE

## 2019-01-28 RX ORDER — LISINOPRIL 10 MG/1
20 TABLET ORAL DAILY
Qty: 90 TABLET | Refills: 3 | Status: SHIPPED | OUTPATIENT
Start: 2019-01-28 | End: 2019-02-01

## 2019-01-28 ASSESSMENT — MIFFLIN-ST. JEOR: SCORE: 1196.96

## 2019-01-28 NOTE — PROGRESS NOTES
SUBJECTIVE:   Rebekah Lacey is a 48 year old female who presents to clinic today for the following health issues:      Medication Followup of Lisinopril    Taking Medication as prescribed: yes    Side Effects:  None    Medication Helping Symptoms:  Yes, somewhat     Checks BP at home: 135-138/80-90s, checks in the morning    Activity: Yes, doing more recently.          Problem list and histories reviewed & adjusted, as indicated.  Additional history: as documented    Patient Active Problem List   Diagnosis     Hypothyroidism due to acquired atrophy of thyroid     Fibrocystic changes of left breast     Past Surgical History:   Procedure Laterality Date     breast lump removal  1997     DILATION AND CURETTAGE  2011    x2       Social History     Tobacco Use     Smoking status: Never Smoker     Smokeless tobacco: Never Used   Substance Use Topics     Alcohol use: Yes     Family History   Problem Relation Age of Onset     Hyperlipidemia Mother      Hypertension Mother      Hyperlipidemia Father      Hypertension Father      Heart Disease Father      Hypertension Brother          Current Outpatient Medications   Medication Sig Dispense Refill     Fluticasone Propionate (FLONASE ALLERGY RELIEF NA)        levothyroxine (SYNTHROID/LEVOTHROID) 137 MCG tablet   0     lisinopril (PRINIVIL/ZESTRIL) 10 MG tablet Take 2 tablets (20 mg) by mouth daily 90 tablet 3     Loratadine (CLARITIN PO)        Labs reviewed in EPIC    Reviewed and updated as needed this visit by clinical staff  Tobacco  Allergies  Meds       Reviewed and updated as needed this visit by Provider         ROS:  CONSTITUTIONAL: NEGATIVE for fever, chills, change in weight  INTEGUMENTARY/SKIN: NEGATIVE for worrisome rashes, moles or lesions  EYES: NEGATIVE for vision changes or irritation  ENT/MOUTH: NEGATIVE for ear, mouth and throat problems  RESP: NEGATIVE for significant cough or SOB  BREAST: NEGATIVE for masses, tenderness or discharge  CV: NEGATIVE  "for chest pain, palpitations or peripheral edema  GI: NEGATIVE for nausea, abdominal pain, heartburn, or change in bowel habits  : NEGATIVE for frequency, dysuria, or hematuria  MUSCULOSKELETAL: NEGATIVE for significant arthralgias or myalgia  NEURO: NEGATIVE for weakness, dizziness or paresthesias  ENDOCRINE: NEGATIVE for temperature intolerance, skin/hair changes  HEME: NEGATIVE for bleeding problems  PSYCHIATRIC: NEGATIVE for changes in mood or affect  This document serves as a record of the services and decisions personally performed and made by Adrien Costello MD. It was created on his behalf by Valerio Anderson, a trained medical scribe. The creation of this document is based the provider's statements to the medical scribe.  Scribe Valerio Anderson 3:59 PM, January 28, 2019    OBJECTIVE:   BP (!) 134/91 (BP Location: Left arm, Patient Position: Sitting, Cuff Size: Adult Small)   Pulse 86   Temp 98.3  F (36.8  C) (Oral)   Ht 1.651 m (5' 5\")   Wt 57.1 kg (125 lb 14.4 oz)   SpO2 99%   Breastfeeding? No   BMI 20.95 kg/m    Body mass index is 20.95 kg/m .  Neck was supple without adenopathy or thyromegaly her carotids were normal without bruits  Chest clear to auscultation and percussion  Cardiovascular S1 and S2 are physiologic without murmurs or gallops  Abdomen bowel sounds were normal.  There is no palpable mass or organomegaly  Extremities nontender without any edema  Pulses pedal pulses are as described otherwise his pulses are bilaterally symmetrical throughout without bruits  Skin without significant abnormality    BP Recheck: 134/90    ASSESSMENT/PLAN:   Rebekah was seen today for recheck medication.    Diagnoses and all orders for this visit:    Essential hypertension  Relatively uncontrolled, increase Lisinopril dosage to 20mg daily. Will contact with lab results once completed. Recommended to check BP twice a day.   -     Basic metabolic panel  -     lisinopril (PRINIVIL/ZESTRIL) 10 MG tablet; Take 2 tablets (20 mg) " by mouth daily    Hypothyroidism due to acquired atrophy of thyroid  -     TSH with free T4 reflex    Routine general medical examination at a health care facility  -     lisinopril (PRINIVIL/ZESTRIL) 10 MG tablet; Take 2 tablets (20 mg) by mouth daily    The information in this document, created by the medical scribe for me, accurately reflects the services I personally performed and the decisions made by me. I have reviewed and approved this document for accuracy prior to leaving the patient care area.  4:09 PM, 01/28/19    Adrien Costello MD  [facility]

## 2019-01-28 NOTE — LETTER
[facility]  [address]  [address]  [place]  [address]  Tel: [phone]    February 1, 2019    Rebekah Lacey  [address] [facility] [address]        Dear MsFaustino Lacey,    The results of your recent Lab tests were within normal limits.      If you have any further questions or problems, please contact our office.      Sincerely,    Adrien Costello MD/[facility]IS          Enclosure: Lab Results  Results for orders placed or performed in visit on 01/28/19   TSH with free T4 reflex   Result Value Ref Range    TSH 0.77 0.40 - 4.00 mU/L   Basic metabolic panel   Result Value Ref Range    Sodium 138 133 - 144 mmol/L    Potassium 3.9 3.4 - 5.3 mmol/L    Chloride 106 94 - 109 mmol/L    Carbon Dioxide 22 20 - 32 mmol/L    Anion Gap 10 3 - 14 mmol/L    Glucose 90 70 - 99 mg/dL    Urea Nitrogen 17 7 - 30 mg/dL    Creatinine 0.66 0.52 - 1.04 mg/dL    GFR Estimate >90 >60 mL/min/[1.73_m2]    GFR Estimate If Black >90 >60 mL/min/[1.73_m2]    Calcium 9.7 8.5 - 10.1 mg/dL

## 2019-01-29 ENCOUNTER — TELEPHONE (OUTPATIENT)
Dept: FAMILY MEDICINE | Facility: CLINIC | Age: 49
End: 2019-01-29

## 2019-01-29 LAB
ANION GAP SERPL CALCULATED.3IONS-SCNC: 10 MMOL/L (ref 3–14)
BUN SERPL-MCNC: 17 MG/DL (ref 7–30)
CALCIUM SERPL-MCNC: 9.7 MG/DL (ref 8.5–10.1)
CHLORIDE SERPL-SCNC: 106 MMOL/L (ref 94–109)
CO2 SERPL-SCNC: 22 MMOL/L (ref 20–32)
CREAT SERPL-MCNC: 0.66 MG/DL (ref 0.52–1.04)
GFR SERPL CREATININE-BSD FRML MDRD: >90 ML/MIN/{1.73_M2}
GLUCOSE SERPL-MCNC: 90 MG/DL (ref 70–99)
POTASSIUM SERPL-SCNC: 3.9 MMOL/L (ref 3.4–5.3)
SODIUM SERPL-SCNC: 138 MMOL/L (ref 133–144)
TSH SERPL DL<=0.005 MIU/L-ACNC: 0.77 MU/L (ref 0.4–4)

## 2019-01-29 NOTE — TELEPHONE ENCOUNTER
Prior Authorization Retail Medication Request    Medication/Dose: Lisinopril 10 mg  ICD code (if different than what is on RX):  I10, Z00.00  Previously Tried and Failed:  none  Rationale:  Patient stable on current medication    Insurance Name:  [employer]  Insurance ID:  [id number]      Pharmacy Information (if different than what is on RX)  Name:  [id number]  Phone:  [phone]

## 2019-02-01 ENCOUNTER — OFFICE VISIT (OUTPATIENT)
Dept: OBGYN | Facility: CLINIC | Age: 49
End: 2019-02-01
Payer: COMMERCIAL

## 2019-02-01 ENCOUNTER — ANCILLARY PROCEDURE (OUTPATIENT)
Dept: MAMMOGRAPHY | Facility: CLINIC | Age: 49
End: 2019-02-01
Payer: COMMERCIAL

## 2019-02-01 ENCOUNTER — TELEPHONE (OUTPATIENT)
Dept: FAMILY MEDICINE | Facility: CLINIC | Age: 49
End: 2019-02-01

## 2019-02-01 VITALS
DIASTOLIC BLOOD PRESSURE: 80 MMHG | BODY MASS INDEX: 21.67 KG/M2 | WEIGHT: 130.1 LBS | SYSTOLIC BLOOD PRESSURE: 122 MMHG | HEIGHT: 65 IN | HEART RATE: 72 BPM

## 2019-02-01 DIAGNOSIS — Z12.31 VISIT FOR SCREENING MAMMOGRAM: ICD-10-CM

## 2019-02-01 DIAGNOSIS — E03.4 HYPOTHYROIDISM DUE TO ACQUIRED ATROPHY OF THYROID: ICD-10-CM

## 2019-02-01 DIAGNOSIS — I10 ESSENTIAL HYPERTENSION: ICD-10-CM

## 2019-02-01 DIAGNOSIS — Z13.220 ENCOUNTER FOR LIPID SCREENING FOR CARDIOVASCULAR DISEASE: ICD-10-CM

## 2019-02-01 DIAGNOSIS — Z01.419 ENCOUNTER FOR GYNECOLOGICAL EXAMINATION WITHOUT ABNORMAL FINDING: Primary | ICD-10-CM

## 2019-02-01 DIAGNOSIS — I10 HYPERTENSION, ESSENTIAL: ICD-10-CM

## 2019-02-01 DIAGNOSIS — Z13.6 ENCOUNTER FOR LIPID SCREENING FOR CARDIOVASCULAR DISEASE: ICD-10-CM

## 2019-02-01 DIAGNOSIS — Z00.00 ROUTINE GENERAL MEDICAL EXAMINATION AT A HEALTH CARE FACILITY: ICD-10-CM

## 2019-02-01 PROCEDURE — 77067 SCR MAMMO BI INCL CAD: CPT | Mod: TC

## 2019-02-01 PROCEDURE — 99396 PREV VISIT EST AGE 40-64: CPT | Performed by: OBSTETRICS & GYNECOLOGY

## 2019-02-01 PROCEDURE — 36415 COLL VENOUS BLD VENIPUNCTURE: CPT | Performed by: OBSTETRICS & GYNECOLOGY

## 2019-02-01 PROCEDURE — 80061 LIPID PANEL: CPT | Performed by: OBSTETRICS & GYNECOLOGY

## 2019-02-01 RX ORDER — LISINOPRIL 20 MG/1
20 TABLET ORAL DAILY
Qty: 90 TABLET | Refills: 3 | Status: SHIPPED | OUTPATIENT
Start: 2019-02-01 | End: 2020-01-30

## 2019-02-01 ASSESSMENT — MIFFLIN-ST. JEOR: SCORE: 1216.01

## 2019-02-01 ASSESSMENT — ANXIETY QUESTIONNAIRES
5. BEING SO RESTLESS THAT IT IS HARD TO SIT STILL: NOT AT ALL
1. FEELING NERVOUS, ANXIOUS, OR ON EDGE: NOT AT ALL
3. WORRYING TOO MUCH ABOUT DIFFERENT THINGS: NOT AT ALL
IF YOU CHECKED OFF ANY PROBLEMS ON THIS QUESTIONNAIRE, HOW DIFFICULT HAVE THESE PROBLEMS MADE IT FOR YOU TO DO YOUR WORK, TAKE CARE OF THINGS AT HOME, OR GET ALONG WITH OTHER PEOPLE: NOT DIFFICULT AT ALL
7. FEELING AFRAID AS IF SOMETHING AWFUL MIGHT HAPPEN: NOT AT ALL
6. BECOMING EASILY ANNOYED OR IRRITABLE: NOT AT ALL
GAD7 TOTAL SCORE: 0
2. NOT BEING ABLE TO STOP OR CONTROL WORRYING: NOT AT ALL

## 2019-02-01 ASSESSMENT — PATIENT HEALTH QUESTIONNAIRE - PHQ9
SUM OF ALL RESPONSES TO PHQ QUESTIONS 1-9: 0
5. POOR APPETITE OR OVEREATING: NOT AT ALL

## 2019-02-01 NOTE — TELEPHONE ENCOUNTER
Reason for Call:  Other prescription  Detailed comments: lisinopril (PRINIVIL/ZESTRIL) 10 MG tabletlisinopril :2 tablets daily.     Ins told ez that they will cover the 20mg tablet once a day.    Phone Number J[facility] can be reached at:  [facility]  [phone]    Best Time:Today    Can we leave a detailed message on this number? YES    Call taken on 2/1/2019 at 8:58 AM by Margareth Barber

## 2019-02-01 NOTE — TELEPHONE ENCOUNTER
Returned call to pharmacy.      OK'd Lisinopril 20mg tablets due to insurance coverage.     Sent new RX to pharmacy with that change, updating epic med list.     Ela BRITT RN,BSN

## 2019-02-01 NOTE — PROGRESS NOTES
Rebekah is a 48 year old  female who presents for annual exam.     Besides routine health maintenance, she has no other health concerns today .    HPI:  The patient's PCP is Adrien Costello MD.      Had a difficult week d/t her  having a heart attack 2 days ago at age 48. Had severe HTN and was working with doc to get it down and under control but wasn't yet. Was working out in the morning and had severe chest pain. Rushed him to hospital and he's doing ok but had a stent placed. Then had bleeding from his groin puncture site so being discharged today. Had just had a full cardiac w/u and stress test before this happened and everything was fine so really shocked them. He has fhx of heart disease but not this young.    Patient herself has HTN dx now. Being managed by Dr. Costello. Was just increased on her lisinopril from 10mg to 20mg but hasn't actually switched yet and today's BP is normal. Wondering if she really should increase then.  Has had documented elevated BPs in the last few months/visits.    Had a BMP with Dr. Costello recently but hasn't had lipids in a long time b/c is never fasting when she comes in for appointment so they haven't done it. Knows she needs to get that done at some point.    Periods are still fairly regular. Anywhere from 28-35 days. Missed decemeber and did the same the year prior. Thinks maybe December is just a crazy busy month for them. Not getting any vasomotor sx or vaginal dryness. Her older sister had late menopause into her mid to later 50's. Wondering about hormone testing now that she's feeling fine for comparison to later when she becomes (elmer)menopausal. Thinks she would absolutely do HRT if was having sx but heard HRT can cause weight gain b/c it did in her sis in law    Kids are doing well. Both girls in [employer] this December. Son is 6 and girls are almost 15 and 13      GYNECOLOGIC HISTORY:    Patient's last menstrual period was 2019 (exact  date).  Her current contraception method is: vasectomy.  She  reports that  has never smoked. she has never used smokeless tobacco.    Patient is sexually active.  STD testing offered?  Declined  Last PHQ-9 score on record =   PHQ-9 SCORE 2019   PHQ-9 Total Score 0     Last GAD7 score on record =   JACQUELINE-7 SCORE 2019   Total Score 0     Alcohol Score = 4    HEALTH MAINTENANCE:  Cholesterol: (  Cholesterol   Date Value Ref Range Status   2019 238 (H) <200 mg/dL Final     Comment:     Desirable:       <200 mg/dl      Last Mammo: 17, Result: normal, Next Mammo: today   Pap: 17 neg, HPV-  Colonoscopy: NA, due this year  Dexa: Never    Health maintenance updated:  yes    HISTORY:  Obstetric History       T3      L3     SAB1   TAB0   Ectopic0   Multiple0   Live Births3       # Outcome Date GA Lbr Sammy/2nd Weight Sex Delivery Anes PTL Lv   4 Term 01/01/10    M       3 Term 06    F       2 Term 04    F       1 SAB                   Patient Active Problem List   Diagnosis     Hypothyroidism due to acquired atrophy of thyroid     Fibrocystic changes of left breast     Hypertension, essential     Past Surgical History:   Procedure Laterality Date     breast lump removal       DILATION AND CURETTAGE  2011    x2      Social History     Tobacco Use     Smoking status: Never Smoker     Smokeless tobacco: Never Used   Substance Use Topics     Alcohol use: Yes      Problem (# of Occurrences) Relation (Name,Age of Onset)    Heart Disease (1) Father    Hyperlipidemia (2) Mother, Father    Hypertension (3) Mother, Father, Brother            Current Outpatient Medications   Medication Sig     Fluticasone Propionate (FLONASE ALLERGY RELIEF NA)      levothyroxine (SYNTHROID/LEVOTHROID) 137 MCG tablet      lisinopril (PRINIVIL/ZESTRIL) 20 MG tablet Take 1 tablet (20 mg) by mouth daily     Loratadine (CLARITIN PO)      No current facility-administered medications for this  "visit.      Allergies   Allergen Reactions     Amoxicillin Hives     Clindamycin Hives     Mucinex Child Multi-Symptom Rash       Past medical, surgical, social and family histories were reviewed and updated in EPIC.    ROS:   12 point review of systems negative other than symptoms noted below.    EXAM:  /80   Pulse 72   Ht 1.651 m (5' 5\")   Wt 59 kg (130 lb 1.6 oz)   LMP 01/21/2019 (Exact Date)   BMI 21.65 kg/m     BMI: Body mass index is 21.65 kg/m .    PHYSICAL EXAM:  Constitutional:  Appearance: Well nourished, well developed, alert, in no acute distress  Neck:  Lymph Nodes:  No lymphadenopathy present    Thyroid:  Gland size normal, nontender, no nodules or masses present  on palpation  Chest:  Respiratory Effort:  Breathing unlabored  Cardiovascular:    Heart: Auscultation:  Regular rate, normal rhythm, no murmurs present  Breasts: Palpation of Breasts and Axillae:  No masses present on palpation, no breast tenderness. and No nodularity, asymmetry or nipple discharge bilaterally.  Gastrointestinal:   Abdominal Examination:  Abdomen nontender to palpation, tone normal without rigidity or guarding, no masses present, umbilicus without lesions   Liver and Spleen:  No hepatomegaly present, liver nontender to palpation    Hernias:  No hernias present  Lymphatic: Lymph Nodes:  No other lymphadenopathy present  Skin:  General Inspection:  No rashes present, no lesions present, no areas of  discoloration    Genitalia and Groin:  No rashes present, no lesions present, no areas of  discoloration, no masses present  Neurologic/Psychiatric:    Mental Status:  Oriented X3     Pelvic Exam:  External Genitalia:     Normal appearance for age, no discharge present, no tenderness present, no inflammatory lesions present, color normal  Vagina:     Normal vaginal vault without central or paravaginal defects, no discharge present, no inflammatory lesions present, no masses present  Bladder:     Nontender to " palpation  Urethra:   Urethral Body:  Urethra palpation normal, urethra structural support normal   Urethral Meatus:  No erythema or lesions present  Cervix:     Appearance healthy, no lesions present, nontender to palpation, no bleeding present  Uterus:     Uterus: firm, normal sized and nontender, anteverted in position.   Adnexa:     No adnexal tenderness present, no adnexal masses present  Perineum:     Perineum within normal limits, no evidence of trauma, no rashes or skin lesions present  Anus:     Anus within normal limits, no hemorrhoids present  Inguinal Lymph Nodes:     No lymphadenopathy present  Pubic Hair:     Normal pubic hair distribution for age  Genitalia and Groin:     No rashes present, no lesions present, no areas of discoloration, no masses present      COUNSELING:   Reviewed preventive health counseling, as reflected in patient instructions  Special attention given to:        (Bea)menopause management    BMI: Body mass index is 21.65 kg/m .      ASSESSMENT:  48 year old female with satisfactory annual exam.    ICD-10-CM    1. Encounter for gynecological examination without abnormal finding Z01.419    2. Hypothyroidism due to acquired atrophy of thyroid E03.4    3. Hypertension, essential I10    4. Encounter for lipid screening for cardiovascular disease Z13.220 Lipid Profile    Z13.6        PLAN:  Pap is UTD for another 4 yrs    mammo today    Hypothyroidism managed by endo and her HTN by pcp    Discussed just doing a nonfasting lipid screen today. Discussed that ideal LDL goal for someone with HTN is different than for someone without so a nonfasting lipid may put her in a grey zone where she will have to come back in fasting. However given what her  just went through I think nonfasting lipids just to r/o extremely high levels would be advisable so will do that today. Depending on results can always plan a fasting panel in the future    Discussed her recent increase in lisinopril. Have  strongly encouraged her to follow Dr. Costello's recs as even though she is normal today w/o yet having increased it, overall she is running higher than ideal and the lower the BP the better for CAD and again given what happened to her  it's better to have it lower as long as not sx then to have it borderline. Patient agrees and will increase her dose as recommended.    Discussed expectations with menopause including timing, periods getting closer together, vasomotor sx and pros/cons of HRT    Will need a colonoscopy this year    Hawa Joiner MD

## 2019-02-02 LAB
CHOLEST SERPL-MCNC: 238 MG/DL
HDLC SERPL-MCNC: 128 MG/DL
LDLC SERPL CALC-MCNC: 103 MG/DL
NONHDLC SERPL-MCNC: 110 MG/DL
TRIGL SERPL-MCNC: 34 MG/DL

## 2019-02-02 ASSESSMENT — ANXIETY QUESTIONNAIRES: GAD7 TOTAL SCORE: 0

## 2019-02-04 NOTE — TELEPHONE ENCOUNTER
Central Prior Authorization Team   Phone: [phone]      PA NOT NEEDED-STRENGTH CHANGE  Medication: Lisinopril 10 mg-MEDICATION CHANGE TO 20MG  Insurance Company:    Pharmacy Filling the Rx:    Filling Pharmacy Phone:    Filling Pharmacy Fax:    Start Date: 2/4/2019    Called pharmacy to verify if PA is still needed, advised no. Strength was changed to 20mg and was able to process through insurance.

## 2019-02-16 DIAGNOSIS — Z00.00 ROUTINE GENERAL MEDICAL EXAMINATION AT A HEALTH CARE FACILITY: ICD-10-CM

## 2019-02-16 DIAGNOSIS — I10 ESSENTIAL HYPERTENSION: ICD-10-CM

## 2019-02-16 NOTE — TELEPHONE ENCOUNTER
"lisinopril (PRINIVIL/ZESTRIL) 20 MG tablet  Last Written Prescription Date:  2/01/19  Last Fill Quantity: 90 tablet,  # refills: 3   Last office visit: 1/28/2019 with prescribing provider:  Pravin   Future Office Visit:      Refill request is for 10mg tab, per chart pt now taking 20mg tab    Requested Prescriptions   Pending Prescriptions Disp Refills     lisinopril (PRINIVIL/ZESTRIL) 10 MG tablet [Pharmacy Med Name: LISINOPRIL 10MG TABLETS] 30 tablet 0     Sig: TAKE 1 TABLET(10 MG) BY MOUTH DAILY    ACE Inhibitors (Including Combos) Protocol Passed - 2/16/2019 12:16 PM       Passed - Blood pressure under 140/90 in past 12 months    BP Readings from Last 3 Encounters:   02/01/19 122/80   01/28/19 (!) 134/91   12/18/18 (!) 146/96                Passed - Recent (12 mo) or future (30 days) visit within the authorizing provider's specialty    Patient had office visit in the last 12 months or has a visit in the next 30 days with authorizing provider or within the authorizing provider's specialty.  See \"Patient Info\" tab in inbasket, or \"Choose Columns\" in Meds & Orders section of the refill encounter.             Passed - Medication is active on med list       Passed - Patient is age 18 or older       Passed - No active pregnancy on record       Passed - Normal serum creatinine on file in past 12 months    Recent Labs   Lab Test 01/28/19  1609   CR 0.66            Passed - Normal serum potassium on file in past 12 months    Recent Labs   Lab Test 01/28/19  1609   POTASSIUM 3.9            Passed - No positive pregnancy test within past 12 months          "

## 2019-02-19 RX ORDER — LISINOPRIL 10 MG/1
TABLET ORAL
Refills: 0
Start: 2019-02-19

## 2019-09-29 ENCOUNTER — HEALTH MAINTENANCE LETTER (OUTPATIENT)
Age: 49
End: 2019-09-29

## 2019-10-23 ENCOUNTER — APPOINTMENT (OUTPATIENT)
Dept: VASCULAR SURGERY | Facility: CLINIC | Age: 49
End: 2019-10-23
Payer: COMMERCIAL

## 2019-10-23 PROCEDURE — 99207 ZZC VEINSOLUTIONS FREE SCREENING: CPT | Performed by: SURGERY

## 2019-11-05 ENCOUNTER — TELEPHONE (OUTPATIENT)
Dept: FAMILY MEDICINE | Facility: CLINIC | Age: 49
End: 2019-11-05

## 2019-11-05 NOTE — TELEPHONE ENCOUNTER
[facility] appointment request   Sent [employer] message to triage pt's high blood pressure:        Hi Ronda,   Do you have a list of your blood pressure readings?   How high has your blood pressure been running?  Have you been taking your lisinopril on a regular basis?   Any symptoms along with your elevated readings (e.g. dizziness, headache, chest pain, breathing concerns, vision changes)?     Romina CARMEN RN      ===View-only below this line===      ----- Message -----     From: Rebekah Lacey     Sent: 11/5/2019  2:20 PM CST       To: Patient Appointment Schedule Request Message List  Subject: RE: Appointment scheduled from [employer]    Appointment For: Rebekah Lacey ([id number])  Visit Type: [facility] OFFICE VISIT SHORT (910)    11/20/2019  11:00 AM  30 mins.  Adrien Costello MD       [facility]    Patient Comments:  my blood pressure seems to be too high especially in the am

## 2019-11-07 NOTE — TELEPHONE ENCOUNTER
THEE Vo about upcoming appt:     Patient responded to MyChart:   Initially when I went on the higher dose I was 110/70 sara then it started creeping 120/80.  I am very bad at taking it at night so usually in the morning.  My  got a better device and in the mornings it has been as high as 140/150 80/90. I took it today after working out and it was 145/90.  It was better if I took the meds at night but I am terrible at remembering taking it at night.  I take it religiously in the morning.     Also asked her if symptoms  Response:   I feel great. No symptoms. Ronda

## 2019-11-07 NOTE — TELEPHONE ENCOUNTER
Pt called stating we called her. No note saying we had reached out to her. RN's unsure what to speak with her about    Pt ph: [phone]

## 2019-11-14 ENCOUNTER — OFFICE VISIT (OUTPATIENT)
Dept: VASCULAR SURGERY | Facility: CLINIC | Age: 49
End: 2019-11-14

## 2019-11-14 PROCEDURE — 36468 NJX SCLRSNT SPIDER VEINS: CPT | Performed by: SURGERY

## 2019-11-14 PROCEDURE — S9999 SALES TAX: HCPCS | Performed by: SURGERY

## 2019-11-20 ENCOUNTER — OFFICE VISIT (OUTPATIENT)
Dept: FAMILY MEDICINE | Facility: CLINIC | Age: 49
End: 2019-11-20
Payer: COMMERCIAL

## 2019-11-20 VITALS
SYSTOLIC BLOOD PRESSURE: 136 MMHG | DIASTOLIC BLOOD PRESSURE: 85 MMHG | TEMPERATURE: 97.8 F | BODY MASS INDEX: 20.91 KG/M2 | HEIGHT: 65 IN | WEIGHT: 125.5 LBS | HEART RATE: 75 BPM | OXYGEN SATURATION: 100 %

## 2019-11-20 DIAGNOSIS — I10 ESSENTIAL HYPERTENSION: ICD-10-CM

## 2019-11-20 DIAGNOSIS — Z12.11 SPECIAL SCREENING FOR MALIGNANT NEOPLASMS, COLON: ICD-10-CM

## 2019-11-20 DIAGNOSIS — Z23 NEED FOR PROPHYLACTIC VACCINATION AND INOCULATION AGAINST INFLUENZA: Primary | ICD-10-CM

## 2019-11-20 DIAGNOSIS — E03.4 HYPOTHYROIDISM DUE TO ACQUIRED ATROPHY OF THYROID: ICD-10-CM

## 2019-11-20 PROCEDURE — 90686 IIV4 VACC NO PRSV 0.5 ML IM: CPT | Performed by: INTERNAL MEDICINE

## 2019-11-20 PROCEDURE — 99213 OFFICE O/P EST LOW 20 MIN: CPT | Mod: 25 | Performed by: INTERNAL MEDICINE

## 2019-11-20 PROCEDURE — 90471 IMMUNIZATION ADMIN: CPT | Performed by: INTERNAL MEDICINE

## 2019-11-20 RX ORDER — HYDROCHLOROTHIAZIDE 12.5 MG/1
25 TABLET ORAL DAILY
Qty: 30 TABLET | Refills: 1 | Status: SHIPPED | OUTPATIENT
Start: 2019-11-20 | End: 2019-12-23

## 2019-11-20 ASSESSMENT — MIFFLIN-ST. JEOR: SCORE: 1190.14

## 2019-11-20 NOTE — PROGRESS NOTES
Subjective     Rebekah Lacey is a 49 year old female who presents to clinic today for the following health issues:    HPI   Hypertension Follow-up      Do you check your blood pressure regularly outside of the clinic? Yes     Are you following a low salt diet? Yes    Are your blood pressures ever more than 140 on the top number (systolic) OR more   than 90 on the bottom number (diastolic), for example 140/90? Yes      How many servings of fruits and vegetables do you eat daily?  4 or more    On average, how many sweetened beverages do you drink each day (soda, juice, sweet tea, etc)?   0    How many days per week do you miss taking your medication? 0  Pt presents to the clinic for a f/u exam. Pt complains that her BP readings have been too high. The pt notes that she checks her BP each AM. She states that her SBP is typically ranges in the 140's. She notes that she has been avoiding excessive salt in her diet and exercising regularly. The pt estimates that she has around 6-8 glasses of beverage daily.     The pt's exercise routine is running, elliptical or biking for 45 minutes to an hour. She also does yoga. She denies a significant change in her endurance.     Patient Active Problem List   Diagnosis     Hypothyroidism due to acquired atrophy of thyroid     Fibrocystic changes of left breast     Hypertension, essential     Past Surgical History:   Procedure Laterality Date     breast lump removal  1997     DILATION AND CURETTAGE  2011    x2       Social History     Tobacco Use     Smoking status: Never Smoker     Smokeless tobacco: Never Used   Substance Use Topics     Alcohol use: Yes     Family History   Problem Relation Age of Onset     Hyperlipidemia Mother      Hypertension Mother      Hyperlipidemia Father      Hypertension Father      Heart Disease Father      Hypertension Brother          Current Outpatient Medications   Medication Sig Dispense Refill     Fluticasone Propionate (FLONASE ALLERGY RELIEF  "NA)        hydrochlorothiazide (HYDRODIURIL) 12.5 MG tablet Take 2 tablets (25 mg) by mouth daily 30 tablet 1     levothyroxine (SYNTHROID/LEVOTHROID) 137 MCG tablet   0     lisinopril (PRINIVIL/ZESTRIL) 20 MG tablet Take 1 tablet (20 mg) by mouth daily 90 tablet 3     Loratadine (CLARITIN PO)        Allergies   Allergen Reactions     Amoxicillin Hives     Clindamycin Hives     Mucinex Child Multi-Symptom Rash       Reviewed and updated as needed this visit by Provider         Review of Systems   ROS COMP: Constitutional, HEENT, cardiovascular, pulmonary, gi and gu systems are negative, except as otherwise noted.    This document serves as a record of the services and decisions personally performed and made by Adrien Costello MD. It was created on his behalf by Dedrick Whitmore, a trained medical scribe. The creation of this document is based on the provider's statements to the medical scribe.  Dedrick Whitmore November 20, 2019 11:25 AM          Objective    /85 (BP Location: Right arm, Patient Position: Sitting, Cuff Size: Adult Large)   Pulse 75   Temp 97.8  F (36.6  C) (Tympanic)   Ht 1.651 m (5' 5\")   Wt 56.9 kg (125 lb 8 oz)   SpO2 100%   BMI 20.88 kg/m    Body mass index is 20.88 kg/m .       Physical Exam   Neck was supple without adenopathy or thyromegaly her carotids were normal without bruits  Chest clear to auscultation and percussion  Cardiovascular S1 and S2 are physiologic without murmurs or gallops  Abdomen bowel sounds were normal.  There is no palpable mass or organomegaly  Extremities nontender without any edema  Pulses pedal pulses are as described otherwise his pulses are bilaterally symmetrical throughout without bruits  Skin without significant abnormality      Diagnostic Test Results:  Labs reviewed in Epic  none         Assessment & Plan     Essential hypertension  Recommended that the pt start taking a low dose of hydrochlorothiazide along with her current dosage of lisinopril. Continue " having 6-8 glasses of beverage daily.   - hydrochlorothiazide (HYDRODIURIL) 12.5 MG tablet  Dispense: 30 tablet; Refill: 1    Hypothyroidism due to acquired atrophy of thyroid      Special screening for malignant neoplasms, colon    - GASTROENTEROLOGY ADULT REF PROCEDURE ONLY [facility] Grimm [phone]; Dr. NELLA Santoro    Need for prophylactic vaccination and inoculation against influenza    - INFLUENZA VACCINE IM > 6 MONTHS VALENT IIV4 [55282]  - Vaccine Administration, Initial [41402]           FUTURE APPOINTMENTS:       - Follow-up visit in 1 month **Bring home BP cuff to the clinic for f/u visit     Return in about 1 month (around 12/20/2019) for Follow Up.    The information in this document, created by the medical scribe for me, accurately reflects the services I personally performed and the decisions made by me. I have reviewed and approved this document for accuracy prior to leaving the patient care area.  November 20, 2019 12:06 PM    Adrien Costello MD  [facility]

## 2019-12-10 NOTE — PROGRESS NOTES
SUBJECTIVE:                                                   Rebekah Lacey is a 49 year old female who presents to clinic today for the following health issue(s):  Patient presents with:  Breast Problem: lump left breast           HPI:  Patient got  Notification from [facility] that she was due for an annual so made the appointment and got in already but actually isn't due until February.  Since she is only now realizing that she figured she'd ask about a breast lump she noticed.  She has f.c changes and never does her own breast exams. So could very well have been there a long time. However around the time of her appointment she noticed something upper/outer left.  Not really hurting or painful. Didn't notice anything she did in terms of lifting, exercise, etc.  No skin changes, no nipple discharge  Normal mammo . Did have a breast bx at some point but unsure which side, but it was benign.  No fhx of breast cancer    Patient's last menstrual period was 10/10/2019..     Patient is sexually active, .  Using none for contraception.    reports that she has never smoked. She has never used smokeless tobacco.    STD testing offered?  Declined    Health maintenance updated:  yes    Today's PHQ-2 Score:   PHQ-2 (  [employer]) 2019   Q1: Little interest or pleasure in doing things 0   Q2: Feeling down, depressed or hopeless 0   PHQ-2 Score 0   Q1: Little interest or pleasure in doing things -   Q2: Feeling down, depressed or hopeless -   PHQ-2 Score -     Today's PHQ-9 Score:   PHQ-9 SCORE 2019   PHQ-9 Total Score 0     Today's JACQUELINE-7 Score:   JACQUELINE-7 SCORE 2019   Total Score 0       Problem list and histories reviewed & adjusted, as indicated.  Additional history: as documented.    Patient Active Problem List   Diagnosis     Hypothyroidism due to acquired atrophy of thyroid     Fibrocystic changes of left breast     Hypertension, essential     Past Surgical History:   Procedure Laterality Date      breast lump removal  1997     DILATION AND CURETTAGE  2011    x2      Social History     Tobacco Use     Smoking status: Never Smoker     Smokeless tobacco: Never Used   Substance Use Topics     Alcohol use: Yes      Problem (# of Occurrences) Relation (Name,Age of Onset)    Heart Disease (1) Father    Hyperlipidemia (2) Mother, Father    Hypertension (3) Mother, Father, Brother            Current Outpatient Medications   Medication Sig     Fluticasone Propionate (FLONASE ALLERGY RELIEF NA)      hydrochlorothiazide (HYDRODIURIL) 12.5 MG tablet Take 2 tablets (25 mg) by mouth daily     levothyroxine (SYNTHROID/LEVOTHROID) 137 MCG tablet      lisinopril (PRINIVIL/ZESTRIL) 20 MG tablet Take 1 tablet (20 mg) by mouth daily     Loratadine (CLARITIN PO)      No current facility-administered medications for this visit.      Allergies   Allergen Reactions     Amoxicillin Hives     Clindamycin Hives     Mucinex Child Multi-Symptom Rash       ROS:  12 point review of systems negative other than symptoms noted below or in the HPI.  Breast: Lumps  No urinary frequency or dysuria, bladder or kidney problems, POSITIVE for:, irregular menses      OBJECTIVE:     /80   Pulse 70   Wt 57.2 kg (126 lb)   LMP 10/10/2019   BMI 20.97 kg/m    Body mass index is 20.97 kg/m .    Exam:  Constitutional:  Appearance: Well nourished, well developed alert, in no acute distress  Breasts:  Inspection of Breasts:  Symmetric bilaterally.  No puckering.  No skin changes.   Palpation of Breasts and Axillae:  PATIENT HAS F.C BREASTS BILATERALLY WITH MOST IN UPPER OUTER QUADRANTS AND L>R. IN THE UPPER OUTER LEFT BREAST THERE APPEARS TO JUST BE SOME MORE GLANDULAR TISSUE. THERE MAY POSSIBLY BE A DIME TO QUARTER SIZED CYST THAT IS SMOOTHER AND ROUNDER. IT IS MOBILE. VERY MINIMALLY TENDER OR JUST THAT A BIT DIFFERENT SENSATION THAN SURROUNDING TISSUE. DOESN'T HAVE ANY FEATURES OF CONCERN AND MAY NOT EVEN BE A CYST BUT RATHER JUST F.C CHANGES  IN GENERAL  Axillary Lymph Nodes:  No lymphadenopathy present     In-Clinic Test Results:  No results found for this or any previous visit (from the past 24 hour(s)).    ASSESSMENT/PLAN:                                                        ICD-10-CM    1. Breast lump on left side at 1 o'clock position N63.21 MA Diagnostic Digital Bilateral     US Breast Left Limited 1-3 Quadrants         Discussed doing a dx mammo and U/S of the left and since she's so close to her timing for a screening mammo would just do diagnostic of both  However also discussed that this is very highly likely to just be f.c tissue and doesn't have concerning features  Could monitor the area and if grows or changes or becomes more painful then could do that and if doesn't change then in early feb would do her annual and a screening mammo, but 3D and then see if anything further needs to be followed up  Patient thinks she will just monitor and wait for her screening mammo but will check her insurance coverage and see if any clinical features change and contact [facility] if they do.  Orders in for a dx and a left breast U/S for now but if no change by feb then will just do her routine screening mammo at that time    Hawa Joiner MD  [facility] [place]

## 2019-12-13 ENCOUNTER — OFFICE VISIT (OUTPATIENT)
Dept: OBGYN | Facility: CLINIC | Age: 49
End: 2019-12-13
Payer: COMMERCIAL

## 2019-12-13 VITALS
WEIGHT: 126 LBS | SYSTOLIC BLOOD PRESSURE: 128 MMHG | BODY MASS INDEX: 20.97 KG/M2 | HEART RATE: 70 BPM | DIASTOLIC BLOOD PRESSURE: 80 MMHG

## 2019-12-13 DIAGNOSIS — N63.21 BREAST LUMP ON LEFT SIDE AT 1 O'CLOCK POSITION: Primary | ICD-10-CM

## 2019-12-13 PROCEDURE — 99213 OFFICE O/P EST LOW 20 MIN: CPT | Performed by: OBSTETRICS & GYNECOLOGY

## 2019-12-22 DIAGNOSIS — I10 ESSENTIAL HYPERTENSION: ICD-10-CM

## 2019-12-23 RX ORDER — HYDROCHLOROTHIAZIDE 12.5 MG/1
TABLET ORAL
Qty: 30 TABLET | Refills: 0 | Status: SHIPPED | OUTPATIENT
Start: 2019-12-23 | End: 2020-01-09

## 2019-12-23 NOTE — TELEPHONE ENCOUNTER
Medication is being filled for 1 time refill only due to:  due for apt and labs.Carol Cabrera RN       Return in about 1 month (around 12/20/2019) for Follow Up.

## 2019-12-23 NOTE — TELEPHONE ENCOUNTER
"Requested Prescriptions   Pending Prescriptions Disp Refills     hydrochlorothiazide (HYDRODIURIL) 12.5 MG tablet [Pharmacy Med Name: HYDROCHLOROTHIAZIDE 12.5MG TABLETS] 30 tablet 1     Sig: TAKE 2 TABLETS(25 MG) BY MOUTH DAILY       Diuretics (Including Combos) Protocol Passed - 12/22/2019  3:09 AM        Passed - Blood pressure under 140/90 in past 12 months     BP Readings from Last 3 Encounters:   12/13/19 128/80   11/20/19 136/85   02/01/19 122/80                 Passed - Recent (12 mo) or future (30 days) visit within the authorizing provider's specialty     Patient has had an office visit with the authorizing provider or a provider within the authorizing providers department within the previous 12 mos or has a future within next 30 days. See \"Patient Info\" tab in inbasket, or \"Choose Columns\" in Meds & Orders section of the refill encounter.              Passed - Medication is active on med list        Passed - Patient is age 18 or older        Passed - No active pregancy on record        Passed - Normal serum creatinine on file in past 12 months     Recent Labs   Lab Test 01/28/19  1609   CR 0.66              Passed - Normal serum potassium on file in past 12 months     Recent Labs   Lab Test 01/28/19  1609   POTASSIUM 3.9                    Passed - Normal serum sodium on file in past 12 months     Recent Labs   Lab Test 01/28/19  1609                 Passed - No positive pregnancy test in past 12 months        Last Written Prescription Date:  11/20/19  Last Fill Quantity: 30,  # refills: 1   Last office visit: 11/20/2019 with prescribing provider:     Future Office Visit:      Jeana Trejo MA      "

## 2019-12-30 ENCOUNTER — HOSPITAL ENCOUNTER (OUTPATIENT)
Dept: MAMMOGRAPHY | Facility: CLINIC | Age: 49
End: 2019-12-30
Attending: OBSTETRICS & GYNECOLOGY
Payer: COMMERCIAL

## 2019-12-30 ENCOUNTER — HOSPITAL ENCOUNTER (OUTPATIENT)
Dept: MAMMOGRAPHY | Facility: CLINIC | Age: 49
Discharge: HOME | End: 2019-12-30
Attending: OBSTETRICS & GYNECOLOGY | Admitting: OBSTETRICS & GYNECOLOGY
Payer: COMMERCIAL

## 2019-12-30 DIAGNOSIS — N63.21 BREAST LUMP ON LEFT SIDE AT 1 O'CLOCK POSITION: ICD-10-CM

## 2019-12-30 PROCEDURE — 77066 DX MAMMO INCL CAD BI: CPT

## 2019-12-30 PROCEDURE — 76642 ULTRASOUND BREAST LIMITED: CPT | Mod: LT

## 2020-01-06 ENCOUNTER — OFFICE VISIT (OUTPATIENT)
Dept: VASCULAR SURGERY | Facility: CLINIC | Age: 50
End: 2020-01-06

## 2020-01-06 DIAGNOSIS — I78.1 SPIDER TELANGIECTASIS OF SKIN: ICD-10-CM

## 2020-01-06 DIAGNOSIS — I83.93 ASYMPTOMATIC VARICOSE VEINS OF BOTH LOWER EXTREMITIES: ICD-10-CM

## 2020-01-06 PROCEDURE — S9999 SALES TAX: HCPCS | Performed by: SURGERY

## 2020-01-06 PROCEDURE — 36468 NJX SCLRSNT SPIDER VEINS: CPT | Performed by: SURGERY

## 2020-01-07 DIAGNOSIS — I10 ESSENTIAL HYPERTENSION: ICD-10-CM

## 2020-01-07 NOTE — PROGRESS NOTES
[facility] Procedure Note    Rebekah Lacey  January 7, 2020    Indications:   Rebekah Lacey is a 49 year old year old female who returns in follow-up of sclerotherapy for cosmetic purposes.  We performed ultrasound-guided sclerotherapy of large varicosities on the right and left calves.  We also performed direct vision sclerotherapy of reticular veins about the proximal medial right thigh and left proximal medial thigh.    Procedure description:  Details of procedure including risks of allergic reaction, deep vein thrombosis, ulceration, hyperpigmentation and superficial phlebitis were discussed.  Patient voiced understanding and wished to proceed but informed consent had been obtained previously.    Inspection of her legs revealed obvious superficial phlebitis/trapped blood in a tributary on the left medial calf.  Anterior to this the tributary coursing to the great saphenous vein was palpable.    On her right lower extremity, there were clearly varicosities on the medial calf that had not responded significantly to sclerotherapy.  These may have been somewhat smaller than they were at the prior visit but still were clearly visible and patent.  Additionally, the reticular veins in the proximal medial right thigh were still quite visible but those reticular veins in the proximal medial left thigh had dissipated nicely.    Sclerotherapy:.  I imaged the left medial calf with ultrasound and noted that there was a segment of noncompressible vein but the more proximal segment of the vein coursing from the great saphenous vein was still compressible.  I therefore localized the vein, directed a 27-gauge needle into the vein and injected 1% polidocanol foam and 1 part polidocanol to 2.5 parts air mixture.  This filled the vein nicely and sent it into tight spasm.  I noted that the more distal portion of this tributary medicated with a  which led directly into a sizable gastrocnemius vein.  I did  not want to inject this segment of vein in an effort to prevent gastrocnemius vein thrombosis.    I then imaged the right medial calf and injected 1% polidocanol foam into this cluster of varicosities as well.  The single injection seemed to fill many of these veins, obscuring there visualization for further attempts at sclerotherapy.    I donned the Syris headlight and injected the reticular veins on the proximal medial right thigh and a few scattered telangiectasias on her legs bilaterally.  Used a total of 2 syringes of 0.5% polidocanol foam and a 1 part polidocanol to 2 parts air mixture.  We had the patient perform ankle pumps between these injections.    I then cleaned the area overlying the trapped blood in the left medial calf with alcohol, made a stab wound with an 18-gauge needle and expressed thrombus from this tributary.  A cottonball was then placed over this area after the legs have been cleaned.    Thigh-high compression hose were placed and the patient walk for 10 minutes prior to getting her car to drive home.  She will return in 1 month in follow-up.  Because of the poor results with treatment of the reticular veins in the proximal medial right thigh at her initial session, if she does not make significant progress following this treatment, I will perform the next session to treat these at no charge.    Sclerotherapy  Date/Time: 1/7/2020 9:34 AM  Performed by: Chuy Patterson MD  Authorized by: Chuy Patterson MD     Time out: Immediately prior to the procedure a time out was called    Type:  Cosmetic  Procedure side:  Bilateral  Solution/Amount:  1% POLIDOCANOL  Syringes::  2/3 syringe (2ml)  Sclerotherapy  Date/Time: 1/7/2020 9:36 AM  Performed by: Chuy Patterson MD  Authorized by: Chuy Patterson MD     Type:  Cosmetic  Session:  Full  Procedure side:  Bilateral  Solution/Amount:  .5 POLIDOCANOL  Syringes::  2 (6 mL)  Patient tolerance:  Patient  tolerated the procedure well with no immediate complications        Chuy Patterson MD

## 2020-01-07 NOTE — TELEPHONE ENCOUNTER
"hydrochlorothiazide (HYDRODIURIL) 12.5 MG tablet     Last Written Prescription Date:  12/23/2019  Last Fill Quantity: 30,  # refills: 0   Last office visit: 11/20/2019 with prescribing provider:  Dr. Costello    Future Office Visit:  Unknown     Requested Prescriptions   Pending Prescriptions Disp Refills     hydrochlorothiazide (HYDRODIURIL) 12.5 MG tablet [Pharmacy Med Name: HYDROCHLOROTHIAZIDE 12.5MG TABLETS] 30 tablet 0     Sig: TAKE 2 TABLETS(25 MG) BY MOUTH DAILY. PATIENT DUE FOR APPOINTMENT AND LABS.       Diuretics (Including Combos) Protocol Passed - 1/7/2020  3:09 AM        Passed - Blood pressure under 140/90 in past 12 months     BP Readings from Last 3 Encounters:   12/13/19 128/80   11/20/19 136/85   02/01/19 122/80                 Passed - Recent (12 mo) or future (30 days) visit within the authorizing provider's specialty     Patient has had an office visit with the authorizing provider or a provider within the authorizing providers department within the previous 12 mos or has a future within next 30 days. See \"Patient Info\" tab in inbasket, or \"Choose Columns\" in Meds & Orders section of the refill encounter.              Passed - Medication is active on med list        Passed - Patient is age 18 or older        Passed - No active pregancy on record        Passed - Normal serum creatinine on file in past 12 months     Recent Labs   Lab Test 01/28/19  1609   CR 0.66              Passed - Normal serum potassium on file in past 12 months     Recent Labs   Lab Test 01/28/19  1609   POTASSIUM 3.9                    Passed - Normal serum sodium on file in past 12 months     Recent Labs   Lab Test 01/28/19  1609                 Passed - No positive pregnancy test in past 12 months          "

## 2020-01-08 NOTE — TELEPHONE ENCOUNTER
Last OV with PCP 11-20-19.    Patient given 30 day refill on 12-23-19 and advised to schedule appointment.    No appointment scheduled.     Routing to [facility] to contact patient to inform due for appointment to see PCP.  Please route back to refill pool once scheduled.     Ela BRITT RN,BSN

## 2020-01-09 NOTE — TELEPHONE ENCOUNTER
[employer] message sent to patient to schedule OV     Routing refill request to provider for review/approval because:  Anuradha given x1 and patient did not follow up, please advise    Romina CARMEN RN

## 2020-01-10 RX ORDER — HYDROCHLOROTHIAZIDE 12.5 MG/1
TABLET ORAL
Qty: 30 TABLET | Refills: 0 | Status: SHIPPED | OUTPATIENT
Start: 2020-01-10 | End: 2021-08-25

## 2020-01-27 DIAGNOSIS — I10 ESSENTIAL HYPERTENSION: ICD-10-CM

## 2020-01-27 NOTE — TELEPHONE ENCOUNTER
"Last Written Prescription Date:  1/10/20  Last Fill Quantity: 30,  # refills: 0   Last office visit: 11/20/2019 with prescribing provider:     Future Office Visit:    Requested Prescriptions   Pending Prescriptions Disp Refills     hydrochlorothiazide (HYDRODIURIL) 12.5 MG tablet [Pharmacy Med Name: HYDROCHLOROTHIAZIDE 12.5MG TABLETS] 30 tablet 0     Sig: TAKE 2 TABLETS(25 MG) BY MOUTH DAILY       Diuretics (Including Combos) Protocol Passed - 1/27/2020  3:10 AM        Passed - Blood pressure under 140/90 in past 12 months     BP Readings from Last 3 Encounters:   12/13/19 128/80   11/20/19 136/85   02/01/19 122/80                 Passed - Recent (12 mo) or future (30 days) visit within the authorizing provider's specialty     Patient has had an office visit with the authorizing provider or a provider within the authorizing providers department within the previous 12 mos or has a future within next 30 days. See \"Patient Info\" tab in inbasket, or \"Choose Columns\" in Meds & Orders section of the refill encounter.              Passed - Medication is active on med list        Passed - Patient is age 18 or older        Passed - No active pregancy on record        Passed - Normal serum creatinine on file in past 12 months     Recent Labs   Lab Test 01/28/19  1609   CR 0.66              Passed - Normal serum potassium on file in past 12 months     Recent Labs   Lab Test 01/28/19  1609   POTASSIUM 3.9                    Passed - Normal serum sodium on file in past 12 months     Recent Labs   Lab Test 01/28/19  1609                 Passed - No positive pregnancy test in past 12 months          "

## 2020-01-28 RX ORDER — HYDROCHLOROTHIAZIDE 12.5 MG/1
TABLET ORAL
OUTPATIENT
Start: 2020-01-28

## 2020-01-29 DIAGNOSIS — Z00.00 ROUTINE GENERAL MEDICAL EXAMINATION AT A HEALTH CARE FACILITY: ICD-10-CM

## 2020-01-29 DIAGNOSIS — I10 ESSENTIAL HYPERTENSION: ICD-10-CM

## 2020-01-29 NOTE — TELEPHONE ENCOUNTER
"lisinopril (PRINIVIL/ZESTRIL) 20 MG tablet     Last Written Prescription Date:  2/1/2019  Last Fill Quantity: 90,  # refills: 3   Last office visit: 11/20/2019 with prescribing provider:  Dr. Costello   Future Office Visit:  Unknown     Requested Prescriptions   Pending Prescriptions Disp Refills     lisinopril (PRINIVIL/ZESTRIL) 20 MG tablet [Pharmacy Med Name: LISINOPRIL 20MG TABLETS] 90 tablet 3     Sig: TAKE 1 TABLET(20 MG) BY MOUTH DAILY       ACE Inhibitors (Including Combos) Protocol Failed - 1/29/2020  3:09 AM        Failed - Normal serum creatinine on file in past 12 months     Recent Labs   Lab Test 01/28/19  1609   CR 0.66             Failed - Normal serum potassium on file in past 12 months     Recent Labs   Lab Test 01/28/19  1609   POTASSIUM 3.9             Passed - Blood pressure under 140/90 in past 12 months     BP Readings from Last 3 Encounters:   12/13/19 128/80   11/20/19 136/85   02/01/19 122/80                 Passed - Recent (12 mo) or future (30 days) visit within the authorizing provider's specialty     Patient has had an office visit with the authorizing provider or a provider within the authorizing providers department within the previous 12 mos or has a future within next 30 days. See \"Patient Info\" tab in inbasket, or \"Choose Columns\" in Meds & Orders section of the refill encounter.              Passed - Medication is active on med list        Passed - Patient is age 18 or older        Passed - No active pregnancy on record        Passed - No positive pregnancy test within past 12 months          "

## 2020-01-30 RX ORDER — LISINOPRIL 20 MG/1
TABLET ORAL
Qty: 90 TABLET | Refills: 3 | Status: SHIPPED | OUTPATIENT
Start: 2020-01-30 | End: 2020-04-10

## 2020-01-30 NOTE — TELEPHONE ENCOUNTER
Routing refill request to provider for review/approval because:  Labs out of range:    Labs not current:    Due for apt last month- added in pharm comments to schedule  Please authorize if appropriate.  Thanks,  Carol Cabrera RN        '

## 2020-02-03 ENCOUNTER — OFFICE VISIT (OUTPATIENT)
Dept: VASCULAR SURGERY | Facility: CLINIC | Age: 50
End: 2020-02-03
Payer: COMMERCIAL

## 2020-02-03 DIAGNOSIS — I83.92 ASYMPTOMATIC VARICOSE VEINS OF LEFT LOWER EXTREMITY: Primary | ICD-10-CM

## 2020-02-03 PROCEDURE — 99207 ZZC VEINSOLUTIONS NO CHARGE VISIT: CPT | Performed by: SURGERY

## 2020-02-03 NOTE — PROGRESS NOTES
[facility] office note    Rebekah Lacey returns in follow-up of ultrasound-guided sclerotherapy bilateral lower extremity varicose veins and direct vision treatment of reticular veins in her right lower extremity.  Overall she says she is making progress and that the tenderness of her legs has improved.    Recall she had had such poor results from her initial session of sclerotherapy, today's session will be at NO CHARGE.    Physical exam  General: Pleasant female in no acute distress  Right lower extremity: There are a few areas of trapped blood in the right proximal anteromedial thigh from treatment of reticular vein/small varicose veins in this area.  There has been significant improvement.    There remains some trapped blood on the medial aspect of her right calf but, adjacent to this is a small varicose vein that remains patent.    Likewise, there are areas of trapped blood on the left medial calf but I do not appreciate any significant residual patent varicosities.    I donned the Syris headlight and mixed 1 mL of 1% polidocanol with 2 mL of air forming a foam slurry.  I directed a 30-gauge needle into the varicosity on the right medial calf filling it nicely with foam.    I then imaged the reticular veins in the right proximal anteromedial thigh and injected several of these reticular veins under direct vision.  We filled these veins nicely.    I clean the areas of trapped blood on the right proximal anteromedial thigh with alcohol, made stab wounds with an 18-gauge needle and then expressed the trapped blood.  I also clean the area on the right medial calf and left medial calf with alcohol, making stab wounds with an 18-gauge needle and then expressing blood in both of these areas.  A moderate amount of blood were removed and the induration was improved after this.    We cleaned the legs with saline solution and applied cotton balls to the areas of drainage of trapped blood.  The patient then donned a  right lower extremity compression stocking, walk for 10 minutes prior to getting a car to drive home.    She will return on an as-needed basis.    BOIE Patterson MD

## 2020-02-10 ENCOUNTER — TRANSFERRED RECORDS (OUTPATIENT)
Dept: HEALTH INFORMATION MANAGEMENT | Facility: CLINIC | Age: 50
End: 2020-02-10

## 2020-02-10 LAB — TSH SERPL-ACNC: 2.06 MCU/ML

## 2020-03-10 ENCOUNTER — OFFICE VISIT (OUTPATIENT)
Dept: VASCULAR SURGERY | Facility: CLINIC | Age: 50
End: 2020-03-10
Payer: COMMERCIAL

## 2020-03-10 DIAGNOSIS — I83.812 VARICOSE VEINS OF LEFT LOWER EXTREMITY WITH PAIN: Primary | ICD-10-CM

## 2020-03-10 PROCEDURE — 99212 OFFICE O/P EST SF 10 MIN: CPT | Performed by: SURGERY

## 2020-03-10 NOTE — PROGRESS NOTES
[facility] office note    Rebekah Lacey presents with concerns of pain on the proximal anteromedial left leg extending to the mid leg, occurring a few weeks after her last session of sclerotherapy.  She states that the pain has improved now.  It has not been related to erythema or ecchymosis.  She has had no leg swelling.  No pleuritic chest pain or shortness of breath.    Physical exam  General: Pleasant female in no acute distress  Left lower extremity: There is mild hyperpigmentation along the course of a treated reticular vein on the posterior medial aspect of the left mid calf.  More anteriorly, in the area of her pain, there is no ecchymosis, induration or erythema.  There is no posterior calf tenderness nor popliteal space tenderness.    I imaged the left lower extremity and noted no evidence of deep vein thrombosis.  The tributary from the left great saphenous vein which coursed down the leg, parallel to the great saphenous vein, was noncompressible.  This was the area of discomfort for her.    I reassured her that this normal medical risk to her.  She is nearly pain-free at this time and I encouraged her to contact me should other questions or concerns arise.  She will see me on an basis.    OBIE Patterson MD

## 2020-03-10 NOTE — LETTER
3/10/2020         RE: Rebekah Lacey  [address]  [facility] [id number]        Dear Colleague,    Thank you for referring your patient, Rebekah Lacey, to the [facility]. Please see a copy of my visit note below.    [facility] office note    Rebekah Lacey presents with concerns of pain on the proximal anteromedial left leg extending to the mid leg, occurring a few weeks after her last session of sclerotherapy.  She states that the pain has improved now.  It has not been related to erythema or ecchymosis.  She has had no leg swelling.  No pleuritic chest pain or shortness of breath.    Physical exam  General: Pleasant female in no acute distress  Left lower extremity: There is mild hyperpigmentation along the course of a treated reticular vein on the posterior medial aspect of the left mid calf.  More anteriorly, in the area of her pain, there is no ecchymosis, induration or erythema.  There is no posterior calf tenderness nor popliteal space tenderness.    I imaged the left lower extremity and noted no evidence of deep vein thrombosis.  The tributary from the left great saphenous vein which coursed down the leg, parallel to the great saphenous vein, was noncompressible.  This was the area of discomfort for her.    I reassured her that this normal medical risk to her.  She is nearly pain-free at this time and I encouraged her to contact me should other questions or concerns arise.  She will see me on an basis.    OBIE Patterson MD    Again, thank you for allowing me to participate in the care of your patient.        Sincerely,        Chuy Patterson MD

## 2020-03-12 ENCOUNTER — OFFICE VISIT (OUTPATIENT)
Dept: FAMILY MEDICINE | Facility: CLINIC | Age: 50
End: 2020-03-12
Payer: COMMERCIAL

## 2020-03-12 VITALS
DIASTOLIC BLOOD PRESSURE: 80 MMHG | BODY MASS INDEX: 21.16 KG/M2 | WEIGHT: 127 LBS | HEART RATE: 69 BPM | TEMPERATURE: 97.7 F | OXYGEN SATURATION: 99 % | HEIGHT: 65 IN | SYSTOLIC BLOOD PRESSURE: 134 MMHG

## 2020-03-12 DIAGNOSIS — H61.22 IMPACTED CERUMEN OF LEFT EAR: ICD-10-CM

## 2020-03-12 DIAGNOSIS — Z12.11 SPECIAL SCREENING FOR MALIGNANT NEOPLASMS, COLON: ICD-10-CM

## 2020-03-12 DIAGNOSIS — I10 HYPERTENSION, ESSENTIAL: Primary | ICD-10-CM

## 2020-03-12 DIAGNOSIS — E03.4 HYPOTHYROIDISM DUE TO ACQUIRED ATROPHY OF THYROID: ICD-10-CM

## 2020-03-12 PROCEDURE — 99213 OFFICE O/P EST LOW 20 MIN: CPT | Performed by: INTERNAL MEDICINE

## 2020-03-12 ASSESSMENT — MIFFLIN-ST. JEOR: SCORE: 1191.95

## 2020-03-12 NOTE — PROGRESS NOTES
Subjective     Rebekah Lacey is a 50 year old female who presents to clinic today for the following health issues:    HPI   Hypertension Follow-up    Do you check your blood pressure regularly outside of the clinic? Yes     Are you following a low salt diet? Yes    Are your blood pressures ever more than 140 on the top number (systolic) OR more   than 90 on the bottom number (diastolic), for example 140/90? Yes      How many servings of fruits and vegetables do you eat daily?  4 or more    On average, how many sweetened beverages do you drink each day (Examples: soda, juice, sweet tea, etc.  Do NOT count diet or artificially sweetened beverages)?   0    How many days per week do you exercise enough to make your heart beat faster? 7    How many minutes a day do you exercise enough to make your heart beat faster? 60 or more    How many days per week do you miss taking your medication? 0  Pt with a hx of HTN presents to the clinic for a f/u exam. The pt reports that her typical BP's are 140's-135/80's. She states that her BP's are higher in the AM as compared to the PM. The pt notes that she started hydrochlorothiazide in January of 2020. She also states that she has been taking her current dosage of lisinopril as instructed.     Patient Active Problem List   Diagnosis     Hypothyroidism due to acquired atrophy of thyroid     Fibrocystic changes of left breast     Hypertension, essential     Past Surgical History:   Procedure Laterality Date     breast lump removal  1997     DILATION AND CURETTAGE  2011    x2       Social History     Tobacco Use     Smoking status: Never Smoker     Smokeless tobacco: Never Used   Substance Use Topics     Alcohol use: Yes     Family History   Problem Relation Age of Onset     Hyperlipidemia Mother      Hypertension Mother      Hyperlipidemia Father      Hypertension Father      Heart Disease Father      Hypertension Brother          Current Outpatient Medications   Medication Sig  "Dispense Refill     Fluticasone Propionate (FLONASE ALLERGY RELIEF NA)        hydrochlorothiazide (HYDRODIURIL) 12.5 MG tablet TAKE 2 TABLETS(25 MG) BY MOUTH DAILY. PATIENT DUE FOR APPOINTMENT AND LABS. 30 tablet 0     levothyroxine (SYNTHROID/LEVOTHROID) 137 MCG tablet   0     lisinopril (PRINIVIL/ZESTRIL) 20 MG tablet TAKE 1 TABLET(20 MG) BY MOUTH DAILY 90 tablet 3     Loratadine (CLARITIN PO)        Allergies   Allergen Reactions     Amoxicillin Hives     Clindamycin Hives     Mucinex Child Multi-Symptom Rash       Reviewed and updated as needed this visit by Provider         Review of Systems   ROS COMP: Constitutional, HEENT, cardiovascular, pulmonary, gi and gu systems are negative, except as otherwise noted.    This document serves as a record of the services and decisions personally performed and made by Adrien Costello MD. It was created on his behalf by Dedrick Whitmore, a trained medical scribe. The creation of this document is based on the provider's statements to the medical scribe.  Dedrick Whitmore March 12, 2020 3:07 PM          Objective    /80 (BP Location: Left arm, Patient Position: Sitting, Cuff Size: Adult Regular)   Pulse 69   Temp 97.7  F (36.5  C) (Tympanic)   Ht 1.651 m (5' 5\")   Wt 57.6 kg (127 lb)   SpO2 99%   BMI 21.13 kg/m    Body mass index is 21.13 kg/m .     BP Recheck   Home Cuff: 132/87, 137/86, 140/91  Clinic Cuff: 134/84-80    Physical Exam   HEENT right TM normal. left canal was full of secretion which were mechanically removed.   Neck was supple without adenopathy or thyromegaly her carotids were normal without bruits  Chest clear to auscultation and percussion  Cardiovascular S1 and S2 are physiologic without murmurs or gallops  Abdomen bowel sounds were normal.  There is no palpable mass or organomegaly  Extremities nontender without any edema  Pulses pedal pulses are as described otherwise his pulses are bilaterally symmetrical throughout without bruits  Skin without " significant abnormality      Diagnostic Test Results:  Labs reviewed in Epic  No results found for this or any previous visit (from the past 24 hour(s)).        Assessment & Plan     Hypertension, essential  Recommended that the pt start taking 30 mg (1.5 tablets) daily of lisinopril for the next 2 weeks. After the 2 weeks the pt should start taking 40 mg (2 tablets) daily of lisinopril. The pt should discontinue her hydrochlorothiazide. The pt should check her BP BID and record.   - Basic metabolic panel  - CBC with platelets    Hypothyroidism due to acquired atrophy of thyroid      Special screening for malignant neoplasms, colon    - GASTROENTEROLOGY ADULT REF PROCEDURE ONLY [facility] [phone]; Dr. NELLA Santoro  - Fecal colorectal cancer screen (FIT)    Impacted cerumen of left ear  Pt should start washing her ears with a mixture of warm water and hydroperoxide 2x week.            FUTURE APPOINTMENTS:       - Follow-up visit in 1 month     Return in about 1 month (around 4/12/2020) for Follow Up.    The information in this document, created by the medical scribe for me, accurately reflects the services I personally performed and the decisions made by me. I have reviewed and approved this document for accuracy prior to leaving the patient care area.  March 12, 2020 3:38 PM    Adrien Costello MD  [facility]

## 2020-03-15 ENCOUNTER — HEALTH MAINTENANCE LETTER (OUTPATIENT)
Age: 50
End: 2020-03-15

## 2020-03-16 DIAGNOSIS — I10 HYPERTENSION, ESSENTIAL: Primary | ICD-10-CM

## 2020-04-13 ENCOUNTER — MYC MEDICAL ADVICE (OUTPATIENT)
Dept: FAMILY MEDICINE | Facility: CLINIC | Age: 50
End: 2020-04-13

## 2020-04-14 NOTE — TELEPHONE ENCOUNTER
Dr. Costello,    Please see Nafham message below  Do you want to do a telephone visit?    Neda Leonard RN

## 2020-08-07 DIAGNOSIS — I10 HYPERTENSION, ESSENTIAL: ICD-10-CM

## 2020-08-07 DIAGNOSIS — Z13.29 SCREENING FOR HYPOTHYROIDISM: ICD-10-CM

## 2020-08-07 LAB
ERYTHROCYTE [DISTWIDTH] IN BLOOD BY AUTOMATED COUNT: 11.3 % (ref 10–15)
HCT VFR BLD AUTO: 38.1 % (ref 35–47)
HGB BLD-MCNC: 13.3 G/DL (ref 11.7–15.7)
MCH RBC QN AUTO: 34.5 PG (ref 26.5–33)
MCHC RBC AUTO-ENTMCNC: 34.9 G/DL (ref 31.5–36.5)
MCV RBC AUTO: 99 FL (ref 78–100)
PLATELET # BLD AUTO: 219 10E9/L (ref 150–450)
RBC # BLD AUTO: 3.86 10E12/L (ref 3.8–5.2)
WBC # BLD AUTO: 4.7 10E9/L (ref 4–11)

## 2020-08-07 PROCEDURE — 85027 COMPLETE CBC AUTOMATED: CPT | Performed by: INTERNAL MEDICINE

## 2020-08-07 PROCEDURE — 36415 COLL VENOUS BLD VENIPUNCTURE: CPT | Performed by: INTERNAL MEDICINE

## 2020-08-07 PROCEDURE — 84443 ASSAY THYROID STIM HORMONE: CPT | Performed by: INTERNAL MEDICINE

## 2020-08-07 PROCEDURE — 80048 BASIC METABOLIC PNL TOTAL CA: CPT | Performed by: INTERNAL MEDICINE

## 2020-08-08 LAB
ANION GAP SERPL CALCULATED.3IONS-SCNC: 10 MMOL/L (ref 3–14)
BUN SERPL-MCNC: 15 MG/DL (ref 7–30)
CALCIUM SERPL-MCNC: 9.2 MG/DL (ref 8.5–10.1)
CHLORIDE SERPL-SCNC: 104 MMOL/L (ref 94–109)
CO2 SERPL-SCNC: 22 MMOL/L (ref 20–32)
CREAT SERPL-MCNC: 0.64 MG/DL (ref 0.52–1.04)
GFR SERPL CREATININE-BSD FRML MDRD: >90 ML/MIN/{1.73_M2}
GLUCOSE SERPL-MCNC: 105 MG/DL (ref 70–99)
POTASSIUM SERPL-SCNC: 4.3 MMOL/L (ref 3.4–5.3)
SODIUM SERPL-SCNC: 136 MMOL/L (ref 133–144)
TSH SERPL DL<=0.005 MIU/L-ACNC: 0.99 MU/L (ref 0.4–4)

## 2021-01-12 ENCOUNTER — OFFICE VISIT (OUTPATIENT)
Dept: VASCULAR SURGERY | Facility: CLINIC | Age: 51
End: 2021-01-12
Payer: COMMERCIAL

## 2021-01-12 DIAGNOSIS — I83.92 ASYMPTOMATIC VARICOSE VEINS OF LEFT LOWER EXTREMITY: Primary | ICD-10-CM

## 2021-01-12 PROCEDURE — 99213 OFFICE O/P EST LOW 20 MIN: CPT | Performed by: SURGERY

## 2021-01-12 NOTE — PROGRESS NOTES
[facility] established patient visit  Ronda Lacey is familiar to me, having undergone ultrasound-guided sclerotherapy of bilateral lower extremity varicose veins, the last session and February 2020.  This relieved discomfort that she was experiencing but, in approximately July, 2020, she began experiencing intermittent discomfort in her left calf.  This could occur at any time when working out or when lying down to sleep.  It usually lasted for about 5 to 10 minutes and dissipated.  It was never associated with erythema, a mass or swelling of her leg.  She never experienced pleuritic chest pain or shortness of breath.  She has not noticed any changes in the veins of her leg.    Physical exam  General: Pleasant female in no acute distress  Left lower extremity: There is mild hyperpigmentation over the posterior medial mid left calf over an area of about 2 cm in length and 8 mm in width.  There is no induration and no erythema.  This is mildly tender.  No masses are appreciated.  There are some palpable, patent veins near the area but none in the area of her discomfort.    This hyperpigmentation is an area where we treated a varicose vein previously.  The vein seems to have resolved nicely.    Impression  The pain she is experiencing is not related to superficial thrombophlebitis or deep vein thrombosis.  This is likely related to some scarring from the previously treated varicosity in the location.  I do not feel that further work-up is indicated.  I reassured the patient that this is nothing of clinical concern.    She will continue to exercise, wear compression and will contact us if this gets worse.  She voiced understanding and her questions were answered.  If she calls with further concerns, we will perform an ultrasound of the left lower extremity.    OBIE Patterson MD    Dictated using Dragon voice recognition software which may result in transcription errors

## 2021-01-12 NOTE — LETTER
1/12/2021         RE: Rebekah Lacey  [address]  [facility] [id number]        Dear Colleague,    Thank you for referring your patient, Rebekah Lacey, to the [facility] [place]. Please see a copy of my visit note below.    [facility] established patient visit  Ronda Lacey is familiar to me, having undergone ultrasound-guided sclerotherapy of bilateral lower extremity varicose veins, the last session and February 2020.  This relieved discomfort that she was experiencing but, in approximately July, 2020, she began experiencing intermittent discomfort in her left calf.  This could occur at any time when working out or when lying down to sleep.  It usually lasted for about 5 to 10 minutes and dissipated.  It was never associated with erythema, a mass or swelling of her leg.  She never experienced pleuritic chest pain or shortness of breath.  She has not noticed any changes in the veins of her leg.    Physical exam  General: Pleasant female in no acute distress  Left lower extremity: There is mild hyperpigmentation over the posterior medial mid left calf over an area of about 2 cm in length and 8 mm in width.  There is no induration and no erythema.  This is mildly tender.  No masses are appreciated.  There are some palpable, patent veins near the area but none in the area of her discomfort.    This hyperpigmentation is an area where we treated a varicose vein previously.  The vein seems to have resolved nicely.    Impression  The pain she is experiencing is not related to superficial thrombophlebitis or deep vein thrombosis.  This is likely related to some scarring from the previously treated varicosity in the location.  I do not feel that further work-up is indicated.  I reassured the patient that this is nothing of clinical concern.    She will continue to exercise, wear compression and will contact us if this gets worse.  She voiced understanding and her questions were  answered.  If she calls with further concerns, we will perform an ultrasound of the left lower extremity.    OBIE Patterson MD    Dictated using Dragon voice recognition software which may result in transcription errors              Again, thank you for allowing me to participate in the care of your patient.        Sincerely,        Chuy Patterson MD

## 2021-01-14 ENCOUNTER — HEALTH MAINTENANCE LETTER (OUTPATIENT)
Age: 51
End: 2021-01-14

## 2021-03-14 ENCOUNTER — HEALTH MAINTENANCE LETTER (OUTPATIENT)
Age: 51
End: 2021-03-14

## 2021-03-15 ENCOUNTER — TRANSFERRED RECORDS (OUTPATIENT)
Dept: HEALTH INFORMATION MANAGEMENT | Facility: CLINIC | Age: 51
End: 2021-03-15

## 2021-03-15 LAB — TSH SERPL-ACNC: 0.44 UIU/ML (ref 0.3–5)

## 2021-04-09 ENCOUNTER — IMMUNIZATION (OUTPATIENT)
Dept: NURSING | Facility: CLINIC | Age: 51
End: 2021-04-09
Payer: COMMERCIAL

## 2021-04-09 PROCEDURE — 91300 PR COVID VAC PFIZER DIL RECON 30 MCG/0.3 ML IM: CPT

## 2021-04-09 PROCEDURE — 0001A PR COVID VAC PFIZER DIL RECON 30 MCG/0.3 ML IM: CPT

## 2021-04-26 DIAGNOSIS — I10 ESSENTIAL HYPERTENSION: ICD-10-CM

## 2021-04-26 DIAGNOSIS — Z00.00 ROUTINE GENERAL MEDICAL EXAMINATION AT A HEALTH CARE FACILITY: ICD-10-CM

## 2021-04-26 NOTE — TELEPHONE ENCOUNTER
LOV 3- Pravin  No future OV scheduled    Needs establish care appointment, fasting labs    SIG/Pharm note given  MyC sent    RT Racheal (R)

## 2021-04-27 RX ORDER — LISINOPRIL 20 MG/1
20 TABLET ORAL 2 TIMES DAILY
Qty: 60 TABLET | Refills: 0 | OUTPATIENT
Start: 2021-04-27

## 2021-04-27 NOTE — TELEPHONE ENCOUNTER
MyChart message sent advising new PCP is needed has not yet been reviewed    Called patient - is moving now     States she will call back at a more convenient time to set up visit with new PCP     She has enough medication for now and said she doesn't need a refill at this time    Romina CARMEN RN

## 2021-04-30 ENCOUNTER — IMMUNIZATION (OUTPATIENT)
Dept: NURSING | Facility: CLINIC | Age: 51
End: 2021-04-30
Attending: INTERNAL MEDICINE
Payer: COMMERCIAL

## 2021-04-30 PROCEDURE — 0002A PR COVID VAC PFIZER DIL RECON 30 MCG/0.3 ML IM: CPT

## 2021-04-30 PROCEDURE — 91300 PR COVID VAC PFIZER DIL RECON 30 MCG/0.3 ML IM: CPT

## 2021-05-09 ENCOUNTER — HEALTH MAINTENANCE LETTER (OUTPATIENT)
Age: 51
End: 2021-05-09

## 2021-08-23 NOTE — PROGRESS NOTES
"Rebekah is a 51 year old  female who presents for annual exam.     Besides routine health maintenance, she has no other health concerns today.    HPI:  The patient's PCP is none.      Patient is doing well. Last annual was  and then was here  for a breast lump and then with covid and kids just got busy and didn't get back in.  Has a provider that is Rx'ing her HTN meds but none is listed in her chart and none found in care everywhere so will need to clarify this. Shows lisinopril 20mg BID in her chart, which is very atypical dosing.    Also sees rosy for her endo. Had graves and then postablative hypothyroid and on meds. Stable function.     has had a vasectomy. Her last period was 10/20. Has no V.M sx at all. No hot flashes or night sweats. No vaginal dryness or pain with I.C. no urinary sx. Her mom had essentially no menopausal sx at all either so hoping that's how it stays    Daughters are sophomore and a senior. 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 hopign this year is better. They lived in [place] and were renting and not just bought a house in [place] in may just before the housing market exploded. Son is 11 and doing well.    Fasting labs 2.5 yrs ago and normal. Would recommend no further apart than q 3 yrs given htn    Has not yet had a colonoscopy and knows she's due.     Had a bad experience at [facility] the last time she was here. Had a 3D mammo with U/S for her left breast lump and told the tech that I didn't think it was cancer and the tech said, \"your doctor doesn't know that you could have raging cancer\"  The radiologist was great and it all turned out to just be f.c changes.     GYNECOLOGIC HISTORY:    No LMP recorded (lmp unknown). Patient is perimenopausal.    Her current contraception method is: VASECTOMY  She  reports that she " has never smoked. She has never used smokeless tobacco.    Patient is sexually active.  STD testing offered?  Declined    Last PHQ-9 score on record =   PHQ-9 SCORE 2021   PHQ-9 Total Score 0     Last GAD7 score on record =   JACQUELINE-7 SCORE 2021   Total Score 0     Alcohol Score = 4    HEALTH MAINTENANCE:  Cholesterol:   Recent Labs   Lab Test 19  1328   CHOL 238*      *   TRIG 34     TSH   Date Value Ref Range Status   03/15/2021 0.44 0.30 - 5.00 uIU/mL Final     Last Mammo: 19, Result: Normal, Next Mammo: Today   Pap:   Lab Results   Component Value Date    PAP NIL NEG-HPV 2017     Colonoscopy: N/A, Next Colonoscopy: Due  Dexa:  N/A    Health maintenance updated:  No, due for colonoscopy.    HISTORY:  OB History    Para Term  AB Living   4 3 3 0 1 3   SAB TAB Ectopic Multiple Live Births   1 0 0 0 3      # Outcome Date GA Lbr Sammy/2nd Weight Sex Delivery Anes PTL Lv   4 Term 01/01/10    M       3 Term 06    F       2 Term 04    F       1 SAB                Patient Active Problem List   Diagnosis     Hypothyroidism due to acquired atrophy of thyroid     Fibrocystic changes of left breast     Hypertension, essential     Past Surgical History:   Procedure Laterality Date     breast lump removal       DILATION AND CURETTAGE  2011    x2      Social History     Tobacco Use     Smoking status: Never Smoker     Smokeless tobacco: Never Used   Substance Use Topics     Alcohol use: Yes      Problem (# of Occurrences) Relation (Name,Age of Onset)    Heart Disease (1) Father    Hyperlipidemia (2) Mother, Father    Hypertension (3) Mother, Father, Brother            Current Outpatient Medications   Medication Sig     levothyroxine (SYNTHROID/LEVOTHROID) 137 MCG tablet      lisinopril (ZESTRIL) 20 MG tablet Take 1 tablet (20 mg) by mouth 2 times daily     Fluticasone Propionate (FLONASE ALLERGY RELIEF NA)  (Patient not taking: Reported on 2021)  "    Loratadine (CLARITIN PO)  (Patient not taking: Reported on 8/25/2021)     No current facility-administered medications for this visit.     Allergies   Allergen Reactions     Amoxicillin Hives     Clindamycin Hives     Mucinex Child Multi-Symptom Rash       Past medical, surgical, social and family histories were reviewed and updated in EPIC.    ROS:   12 point review of systems negative other than symptoms noted below or in the HPI.  No urinary frequency or dysuria, bladder or kidney problems    EXAM:  /88   Ht 1.651 m (5' 5\")   Wt 57.6 kg (127 lb)   LMP  (LMP Unknown)   BMI 21.13 kg/m     BMI: Body mass index is 21.13 kg/m .    PHYSICAL EXAM:  Constitutional:   Appearance: Well nourished, well developed, alert, in no acute distress  Neck:  Lymph Nodes:  No lymphadenopathy present    Thyroid:  Gland size normal, nontender, no nodules or masses present  on palpation  Chest:  Respiratory Effort:  Breathing unlabored, CTA bilaterally  Cardiovascular:    Heart: Auscultation:  Regular rate, normal rhythm, no murmurs present  Breasts: Palpation of Breasts and Axillae:  No masses present on palpation, no breast tenderness. and Axillary Lymph Nodes:  No lymphadenopathy present. BILATERAL UPPER OUTER QUADRANTS HAS WHAT FEELS TO BE A SILVER DOLLAR SMOOTH RUBBERY ROUND MOBILE MASS THAT IS SYMMETRIC. O/W SOME F.C CHANGES BUT ALL REALLY SMOOTH AND MOBILE LIKE CYSTS RATHER THAN NODULAR TYPICAL F.C CHANGES  Gastrointestinal:   Abdominal Examination:  Abdomen nontender to palpation, tone normal without rigidity or guarding, no masses present, umbilicus without lesions   Liver and Spleen:  No hepatomegaly present, liver nontender to palpation    Hernias:  No hernias present  Lymphatic: Lymph Nodes:  No other lymphadenopathy present  Skin:  General Inspection:  No rashes present, no lesions present, no areas of  discoloration  Neurologic:    Mental Status:  Oriented X3.  Normal strength and tone, sensory exam            "     grossly normal, mentation intact and speech normal.    Psychiatric:   Mentation appears normal and affect normal/bright.         Pelvic Exam:  External Genitalia:     Normal appearance for age, no discharge present, no tenderness present, no inflammatory lesions present, color normal  Vagina:     Normal vaginal vault without central or paravaginal defects, no discharge present, no inflammatory lesions present, no masses present  Bladder:     Nontender to palpation  Urethra:   Urethral Body:  Urethra palpation normal, urethra structural support normal   Urethral Meatus:  No erythema or lesions present  Cervix:     Appearance healthy, no lesions present, nontender to palpation, no bleeding present  Uterus:     Uterus: firm, normal sized and nontender, anteverted in position.   Adnexa:     No adnexal tenderness present, no adnexal masses present  Perineum:     Perineum within normal limits, no evidence of trauma, no rashes or skin lesions present  Anus:     Anus within normal limits, no hemorrhoids present  Inguinal Lymph Nodes:     No lymphadenopathy present  Pubic Hair:     Normal pubic hair distribution for age  Genitalia and Groin:     No rashes present, no lesions present, no areas of discoloration, no masses present      COUNSELING:   Reviewed preventive health counseling, as reflected in patient instructions  Special attention given to:        Colon cancer screening       (Bea)menopause management    BMI: Body mass index is 21.13 kg/m .      ASSESSMENT:  51 year old female with satisfactory annual exam.    ICD-10-CM    1. Encounter for gynecological examination without abnormal finding  Z01.419    2. Hypertension, essential  I10    3. Hypothyroidism due to acquired atrophy of thyroid  E03.4    4. Screen for colon cancer  Z12.11 Adult Gastro Ref - Procedure Only       PLAN:  Pap is UTD for one more years  mammo today  Defer HTN/labs to PCP and thyroid mgmt to endo  Reviewed her breast texture and changes and  that clinical or SBE can never document with sureness what a lump is going to be, but clinical suspicion was very low when here and today has symmetric almost fluid cyst like feel to bilateral upper outer quadrants. Continue to monitor and do annual mammo  Patient agrees to colonoscopy. Referral sent to [facility] and they will contact her    Reviewed one year amenorrhea is menopause. Any bleeding after that is abnormal until proven otherwise so should contact us if that occurs  Usually v.m sx would be ongoing now so very well may not have them. Discussed vaginal dryness, fatigue, insomnia, sexual function, etc as menopause progresses  dexa in 2-3 yrs if is menopausal officially in 2 months    Hawa Joiner MD

## 2021-08-25 ENCOUNTER — OFFICE VISIT (OUTPATIENT)
Dept: OBGYN | Facility: CLINIC | Age: 51
End: 2021-08-25
Payer: COMMERCIAL

## 2021-08-25 ENCOUNTER — ANCILLARY PROCEDURE (OUTPATIENT)
Dept: MAMMOGRAPHY | Facility: CLINIC | Age: 51
End: 2021-08-25
Payer: COMMERCIAL

## 2021-08-25 VITALS
HEIGHT: 65 IN | WEIGHT: 127 LBS | BODY MASS INDEX: 21.16 KG/M2 | DIASTOLIC BLOOD PRESSURE: 88 MMHG | SYSTOLIC BLOOD PRESSURE: 138 MMHG

## 2021-08-25 DIAGNOSIS — Z01.419 ENCOUNTER FOR GYNECOLOGICAL EXAMINATION WITHOUT ABNORMAL FINDING: Primary | ICD-10-CM

## 2021-08-25 DIAGNOSIS — Z12.11 SCREEN FOR COLON CANCER: ICD-10-CM

## 2021-08-25 DIAGNOSIS — I10 HYPERTENSION, ESSENTIAL: ICD-10-CM

## 2021-08-25 DIAGNOSIS — E03.4 HYPOTHYROIDISM DUE TO ACQUIRED ATROPHY OF THYROID: ICD-10-CM

## 2021-08-25 DIAGNOSIS — Z12.31 VISIT FOR SCREENING MAMMOGRAM: ICD-10-CM

## 2021-08-25 PROCEDURE — 77067 SCR MAMMO BI INCL CAD: CPT | Mod: TC | Performed by: RADIOLOGY

## 2021-08-25 PROCEDURE — 99396 PREV VISIT EST AGE 40-64: CPT | Performed by: OBSTETRICS & GYNECOLOGY

## 2021-08-25 ASSESSMENT — ANXIETY QUESTIONNAIRES
5. BEING SO RESTLESS THAT IT IS HARD TO SIT STILL: NOT AT ALL
3. WORRYING TOO MUCH ABOUT DIFFERENT THINGS: NOT AT ALL
GAD7 TOTAL SCORE: 0
IF YOU CHECKED OFF ANY PROBLEMS ON THIS QUESTIONNAIRE, HOW DIFFICULT HAVE THESE PROBLEMS MADE IT FOR YOU TO DO YOUR WORK, TAKE CARE OF THINGS AT HOME, OR GET ALONG WITH OTHER PEOPLE: NOT DIFFICULT AT ALL
2. NOT BEING ABLE TO STOP OR CONTROL WORRYING: NOT AT ALL
1. FEELING NERVOUS, ANXIOUS, OR ON EDGE: NOT AT ALL
6. BECOMING EASILY ANNOYED OR IRRITABLE: NOT AT ALL
7. FEELING AFRAID AS IF SOMETHING AWFUL MIGHT HAPPEN: NOT AT ALL

## 2021-08-25 ASSESSMENT — MIFFLIN-ST. JEOR: SCORE: 1186.95

## 2021-08-25 ASSESSMENT — PATIENT HEALTH QUESTIONNAIRE - PHQ9
5. POOR APPETITE OR OVEREATING: NOT AT ALL
SUM OF ALL RESPONSES TO PHQ QUESTIONS 1-9: 0

## 2021-08-26 ASSESSMENT — ANXIETY QUESTIONNAIRES: GAD7 TOTAL SCORE: 0

## 2021-10-23 ENCOUNTER — HEALTH MAINTENANCE LETTER (OUTPATIENT)
Age: 51
End: 2021-10-23

## 2021-11-24 ENCOUNTER — OFFICE VISIT (OUTPATIENT)
Dept: FAMILY MEDICINE | Facility: CLINIC | Age: 51
End: 2021-11-24
Payer: COMMERCIAL

## 2021-11-24 VITALS
DIASTOLIC BLOOD PRESSURE: 87 MMHG | HEART RATE: 59 BPM | RESPIRATION RATE: 16 BRPM | WEIGHT: 127.6 LBS | HEIGHT: 65 IN | SYSTOLIC BLOOD PRESSURE: 141 MMHG | TEMPERATURE: 97.8 F | OXYGEN SATURATION: 95 % | BODY MASS INDEX: 21.26 KG/M2

## 2021-11-24 DIAGNOSIS — Z12.11 COLON CANCER SCREENING: ICD-10-CM

## 2021-11-24 DIAGNOSIS — I10 ESSENTIAL HYPERTENSION: Primary | ICD-10-CM

## 2021-11-24 DIAGNOSIS — E03.4 HYPOTHYROIDISM DUE TO ACQUIRED ATROPHY OF THYROID: ICD-10-CM

## 2021-11-24 LAB
ERYTHROCYTE [DISTWIDTH] IN BLOOD BY AUTOMATED COUNT: 11.6 % (ref 10–15)
HCT VFR BLD AUTO: 39.9 % (ref 35–47)
HGB BLD-MCNC: 14.1 G/DL (ref 11.7–15.7)
MCH RBC QN AUTO: 35 PG (ref 26.5–33)
MCHC RBC AUTO-ENTMCNC: 35.3 G/DL (ref 31.5–36.5)
MCV RBC AUTO: 99 FL (ref 78–100)
PLATELET # BLD AUTO: 235 10E3/UL (ref 150–450)
RBC # BLD AUTO: 4.03 10E6/UL (ref 3.8–5.2)
WBC # BLD AUTO: 5.8 10E3/UL (ref 4–11)

## 2021-11-24 PROCEDURE — 99214 OFFICE O/P EST MOD 30 MIN: CPT | Performed by: INTERNAL MEDICINE

## 2021-11-24 PROCEDURE — 80048 BASIC METABOLIC PNL TOTAL CA: CPT | Performed by: INTERNAL MEDICINE

## 2021-11-24 PROCEDURE — 36415 COLL VENOUS BLD VENIPUNCTURE: CPT | Performed by: INTERNAL MEDICINE

## 2021-11-24 PROCEDURE — 85027 COMPLETE CBC AUTOMATED: CPT | Performed by: INTERNAL MEDICINE

## 2021-11-24 RX ORDER — LISINOPRIL 20 MG/1
20 TABLET ORAL DAILY
Qty: 90 TABLET | Refills: 3 | Status: SHIPPED | OUTPATIENT
Start: 2021-11-24 | End: 2022-11-17

## 2021-11-24 ASSESSMENT — MIFFLIN-ST. JEOR: SCORE: 1189.67

## 2021-11-24 NOTE — LETTER
November 26, 2021      Rebekah Lacey  [address] [place] [address]        Dear ,    We are writing to inform you of your test results.    The following letter pertains to your most recent diagnostic tests:     -Kidney function is normal for you (Creatinine, GFR), Sodium is normal for you, Potassium is normal for you, Calcium is normal for you, Glucose (blood sugar) is normal for you.     -Your complete blood counts including your hemoglobin returned normal for you.             Bottom line:  Your lab results look stable.         Follow up:  Please send me a message with you home blood pressure readings on lisinopril 20 mg ONCE daily as we discussed so we can advise you on how best to manage your blood pressures.       Resulted Orders   Basic metabolic panel   Result Value Ref Range    Sodium 140 133 - 144 mmol/L    Potassium 4.1 3.4 - 5.3 mmol/L    Chloride 107 94 - 109 mmol/L    Carbon Dioxide (CO2) 26 20 - 32 mmol/L    Anion Gap 7 3 - 14 mmol/L    Urea Nitrogen 16 7 - 30 mg/dL    Creatinine 0.66 0.52 - 1.04 mg/dL    Calcium 9.4 8.5 - 10.1 mg/dL    Glucose 72 70 - 99 mg/dL    GFR Estimate >90 >60 mL/min/1.73m2      Comment:      As of July 11, 2021, eGFR is calculated by the CKD-EPI creatinine equation, without race adjustment. eGFR can be influenced by muscle mass, exercise, and diet. The reported eGFR is an estimation only and is only applicable if the renal function is stable.   CBC with platelets   Result Value Ref Range    WBC Count 5.8 4.0 - 11.0 10e3/uL    RBC Count 4.03 3.80 - 5.20 10e6/uL    Hemoglobin 14.1 11.7 - 15.7 g/dL    Hematocrit 39.9 35.0 - 47.0 %    MCV 99 78 - 100 fL    MCH 35.0 (H) 26.5 - 33.0 pg    MCHC 35.3 31.5 - 36.5 g/dL    RDW 11.6 10.0 - 15.0 %    Platelet Count 235 150 - 450 10e3/uL       If you have any questions or concerns, please call the clinic at the number listed above.       Sincerely,      Pankaj Garcia MD

## 2021-11-24 NOTE — PROGRESS NOTES
Assessment & Plan     Essential hypertension  Her blood pressure is not ideally controlled, goal blood pressure would be that less than 130/80, we discussed this.  I think that there might be some room for lifestyle improvement to get better control of her blood pressure.  She is drinking 1-2 drinks every night.  I counseled her on reducing this to help improve her blood pressure.  I also have a suspicion that some of her blood pressure elevation may be related to whitecoat blood pressure elevation as she does seem a little anxious and uncomfortable in the clinical setting.  Therefore, I recommended that she do some home blood pressure checking and communicate her results with me by Jennifer.  I think that this may be a better way to gauge whether the 20 mg of lisinopril once daily are enough or whether additional drug therapy is needed.  Rather than making her take the lisinopril twice a day, I think that adding a low-dose of amlodipine i.e. 2.5 mg along with 20 mg of lisinopril once daily in the morning would be a more patient centered approach to getting better control of her blood pressure and we discussed this and she agreed that it would be easier to remember to take the medications in the morning when she has a routine.  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  It does seem that she leads a very active healthy lifestyle other than her alcohol intake.  She exercises quite vigorously.  At the conclusion of her visit we decided to continue with lisinopril 20 mg once daily, check home blood pressure readings and communicate those results to me by Jennifer, if the home blood pressure readings are at least less than 140/90, then I think I would recommend that she work hard on reducing alcohol and return in about a 6-month time interval to recheck her blood pressure upon doing so.  If the home blood pressure readings are greater than 140/90, then I think we should add the  2.5 mg of amlodipine along with a 20 mg of lisinopril each morning and follow-up accordingly.  This plan was discussed with the patient in detail and she demonstrated good understanding of the plan and intends to communicate back with me with her home blood pressure readings.  - lisinopril (ZESTRIL) 20 MG tablet; Take 1 tablet (20 mg) by mouth daily  - Basic metabolic panel; Future  - CBC with platelets; Future  - Basic metabolic panel  - CBC with platelets    Hypothyroidism due to acquired atrophy of thyroid  Continue follow-up with Dr. Rinaldi from endocrinology for this    Colon cancer screening  I reminded her that she could be considered 7 years overdue for colon cancer screening due to the recent reduction in the initiation age for colon cancer screening to 45.  She seemed more interested in stool based testing than colonoscopy given her busy life.  We decided to have her  a fit test when she goes to the lab for routine labs.  - Fecal colorectal cancer screen FIT; Future  - Fecal colorectal cancer screen FIT      32 minutes spent on the date of the encounter doing chart review, history and exam, documentation and further activities per the note           Return in about 1 year (around 11/24/2022) for Preventive Visit.  Patient instructed to return to clinic or contact us sooner if symptoms worsen or new symptoms develop.  Or sooner pending home blood pressure readings    Pankaj Garcia MD  [facility] DEBBY Bond is a 51 year old who presents for the following health issues  accompanied by her .    HPI     This is a pleasant 52-year-old female with a history of hypertension.  Her parents have hypertension and her siblings also have hypertension.  She had been seeing Dr. Costello for this.  She has not been seen for this since March 2020 due to Covid.  At the time, she was prescribed lisinopril 20 mg twice daily and hydrochlorothiazide was stopped due to nonspecific side  "effects.  She admits that she has only been taking lisinopril 20 mg once daily, partly because she had been running out of medications and partly because it was difficult for her to remember to take the medication twice daily.  She has not been checking her blood pressure at home.  She needs a refill on lisinopril.  She also has thyroid disease and sees Dr. Rinaldi for this.  Looks like she has Graves' disease.  She has a gynecologist as well.  She has not had colon cancer screening yet.  She is also not had the Shingrix vaccine series.  She did get a flu shot at an outside clinic this season.    Review of Systems         Objective    BP (!) 141/87 (BP Location: Left arm, Cuff Size: Adult Regular)   Pulse 59   Temp 97.8  F (36.6  C) (Tympanic)   Resp 16   Ht 1.651 m (5' 5\")   Wt 57.9 kg (127 lb 9.6 oz)   LMP  (LMP Unknown)   SpO2 95%   BMI 21.23 kg/m    Body mass index is 21.23 kg/m .  Physical Exam   GENERAL: healthy, alert and no distress  NECK: no adenopathy, no asymmetry, masses, or scars and thyroid normal to palpation  RESP: lungs clear to auscultation - no rales, rhonchi or wheezes  CV: regular rate and rhythm, normal S1 S2, no S3 or S4, no murmur, click or rub, no peripheral edema and peripheral pulses strong  ABDOMEN: soft, nontender, no hepatosplenomegaly, no masses and bowel sounds normal  MS: no gross musculoskeletal defects noted, no edema  NEURO: Normal strength and tone, mentation intact and speech normal  PSYCH: She has a mildly anxious affect, well-groomed, normal speech                "

## 2021-11-24 NOTE — PATIENT INSTRUCTIONS
"You should get the new shingles vaccine series \"SHINGRIX\" (not Zostavax) at a pharmacy.    Make sure your home blood pressure cuff  checks your blood pressure on your arm not  your wrist.  Omron is a good brand.    Take your blood pressure after 5 minutes of rest  in the AM and PM for one week and record the  readings (14 total readings).    Send me a [employer] message with the average of your top readings (Systolic blood pressures) and the average of your bottom readings (Diastolic blood pressures).  Our goal is for this average to be less than 140/90.      "

## 2021-11-25 LAB
ANION GAP SERPL CALCULATED.3IONS-SCNC: 7 MMOL/L (ref 3–14)
BUN SERPL-MCNC: 16 MG/DL (ref 7–30)
CALCIUM SERPL-MCNC: 9.4 MG/DL (ref 8.5–10.1)
CHLORIDE BLD-SCNC: 107 MMOL/L (ref 94–109)
CO2 SERPL-SCNC: 26 MMOL/L (ref 20–32)
CREAT SERPL-MCNC: 0.66 MG/DL (ref 0.52–1.04)
GFR SERPL CREATININE-BSD FRML MDRD: >90 ML/MIN/1.73M2
GLUCOSE BLD-MCNC: 72 MG/DL (ref 70–99)
POTASSIUM BLD-SCNC: 4.1 MMOL/L (ref 3.4–5.3)
SODIUM SERPL-SCNC: 140 MMOL/L (ref 133–144)

## 2021-11-26 NOTE — RESULT ENCOUNTER NOTE
The following letter pertains to your most recent diagnostic tests:    -Kidney function is normal for you (Creatinine, GFR), Sodium is normal for you, Potassium is normal for you, Calcium is normal for you, Glucose (blood sugar) is normal for you.     -Your complete blood counts including your hemoglobin returned normal for you.           Bottom line:  Your lab results look stable.        Follow up:  Please send me a message with you home blood pressure readings on lisinopril 20 mg ONCE daily as we discussed so we can advise you on how best to manage your blood pressures.      Sincerely,    Dr. Garcia

## 2022-10-10 ENCOUNTER — HEALTH MAINTENANCE LETTER (OUTPATIENT)
Age: 52
End: 2022-10-10

## 2022-11-16 ENCOUNTER — MYC MEDICAL ADVICE (OUTPATIENT)
Dept: FAMILY MEDICINE | Facility: CLINIC | Age: 52
End: 2022-11-16

## 2022-11-16 DIAGNOSIS — I10 ESSENTIAL HYPERTENSION: ICD-10-CM

## 2022-11-17 RX ORDER — LISINOPRIL 20 MG/1
20 TABLET ORAL DAILY
Qty: 90 TABLET | Refills: 3 | Status: SHIPPED | OUTPATIENT
Start: 2022-11-17 | End: 2023-02-16

## 2022-11-23 NOTE — PROGRESS NOTES
Rebekah is a 52 year old  female who presents for annual exam.     Besides routine health maintenance, she has no other health concerns today .    HPI:  The patient's PCP is DR Garcia    Patient is being seen today for an annual gyn physical exam and last saw me .    Her oldest is going to college now, middle is a senior and younger is 12. Her mother passed away last year at 83 years old due to alzheimer's.     Wanted to discuss about hormone therapy. Will wake up at 2AM and have insomnia. Feels  Warmth but not sweating and needing to change sheets/pajamas. However then is feeling on and off hot and cold and so tossing and turning constantly from 2 am on and not getting any sleep at all. This wasn't happening last year, though no menses for 2  Yrs now already but now it's definitely more noticeable. Working at [employer] so able to wear casual clothes and workout clothes but now noticing that she is getting hot flashes at work as well. Doesn't feel like it's triggered by stress or her clothes. Isn't noticing it linked to alcohol or more sugar but just generally much more in the last year.  When she exercises will notice more hot flashes. No vaginal dryness or pain with I.C at all that she's aware of. Not needing lubricant or anything    Not noticing any incontinence or OAB or significant nocturia. May go to the bathroom when tossing and turning middle of the night but feels to be b/c she's already awake and not b/c of OAB. Drinks a lot of water so goes to void a lot but always a large volume and not just urgent or frequent without actual need     History of hypothyroidism after ablation for graves. Seeing Dr. gautam initially and then seeing Dr. Rinaldi. Wondering about transferring her thyroid stuff to myself or Dr. Garcia since now that hypothyroid and just needs a TSH and med refills it would be easier to streamline care to fewer doctors.    Doing her lisinopril in the AM. BP would be good during the AM then  "get worse throughout the day. Has had caffeine today but no other stimulants or cold meds. Elevated SBP into the 130s despite normal and stable weight, younger age, and 20mg lisinopril.    Made an appt to see Jose to review BP log and determine if should add a second agent med but then mom   and got side tracked and never went back.   so seeing him again in Feb to f/up on her BP medication.    Patient isn't showing covid or flu shots. Reports that she got her original series and one booster \"all over the place\" and states she got a flu shot this year. When mentioned that [facility] should track that regardless of where it's done and don't show it, and discussed covid biboost patient quickly and adamantly declined it    Had mammo  but not scheduled for one this year yet. Is going to have to schedule that asap       GYNECOLOGIC HISTORY:    No LMP recorded (lmp unknown). Patient is postmenopausal.    Her current contraception method is: menopause.  She  reports that she has never smoked. She has never used smokeless tobacco.    Patient is sexually active.  STD testing offered?  Declined  Last PHQ-9 score on record =   PHQ-9 SCORE 2022   PHQ-9 Total Score 0     Last GAD7 score on record =   JACQUELINE-7 SCORE 2022   Total Score 0     Alcohol Score = 4    HEALTH MAINTENANCE:  Cholesterol: (  Cholesterol   Date Value Ref Range Status   2019 238 (H) <200 mg/dL Final     Comment:     Desirable:       <200 mg/dl      Last Mammo: 21, Result: Normal, Next Mammo: Due   Pap: (  Lab Results   Component Value Date    PAP NIL 2017      Colonoscopy:  N/A, Result: Not applicable, Next Colonoscopy: Due   Dexa:  N/A    Health maintenance updated:  no    HISTORY:  OB History    Para Term  AB Living   4 3 3 0 1 3   SAB IAB Ectopic Multiple Live Births   1 0 0 0 3      # Outcome Date GA Lbr Sammy/2nd Weight Sex Delivery Anes PTL Lv   4 Term 01/01/10    M       3 Term 06    F       2 " "Term 04    F       1 SAB                Patient Active Problem List   Diagnosis     Hypothyroidism due to acquired atrophy of thyroid     Fibrocystic changes of left breast     Hypertension, essential     Past Surgical History:   Procedure Laterality Date     breast lump removal       DILATION AND CURETTAGE  2011    x2      Social History     Tobacco Use     Smoking status: Never     Smokeless tobacco: Never   Substance Use Topics     Alcohol use: Yes     Comment: 1-2 drinks per day      Problem (# of Occurrences) Relation (Name,Age of Onset)    Heart Disease (1) Father    Hypertension (3) Mother, Father, Brother    Hyperlipidemia (2) Mother, Father            Current Outpatient Medications   Medication Sig     estradiol (VIVELLE-DOT) 0.1 MG/24HR bi-weekly patch Place 1 patch onto the skin twice a week     Fluticasone Propionate (FLONASE ALLERGY RELIEF NA) Spray in nostril daily as needed      levothyroxine (SYNTHROID/LEVOTHROID) 137 MCG tablet      lisinopril (ZESTRIL) 20 MG tablet Take 1 tablet (20 mg) by mouth daily     progesterone (PROMETRIUM) 100 MG capsule Take 1 capsule (100 mg) by mouth daily     Loratadine (CLARITIN PO) Take by mouth daily as needed  (Patient not taking: Reported on 2022)     No current facility-administered medications for this visit.     Allergies   Allergen Reactions     Amoxicillin Hives     Clindamycin Hives     Hydrochlorothiazide      Cramping     Mucinex Child Multi-Symptom Rash       Past medical, surgical, social and family histories were reviewed and updated in EPIC.    ROS:   12 point review of systems negative other than symptoms noted below or in the HPI.  No urinary frequency or dysuria, bladder or kidney problems    EXAM:  /76   Ht 1.656 m (5' 5.2\")   Wt 59 kg (130 lb)   LMP  (LMP Unknown)   BMI 21.50 kg/m     BMI: Body mass index is 21.5 kg/m .    PHYSICAL EXAM:  Constitutional:   Appearance: Well nourished, well developed, alert, in no acute " distress  Neck:  Lymph Nodes:  No lymphadenopathy present    Thyroid:  Gland size normal, nontender, no nodules or masses present  on palpation  Chest:  Respiratory Effort:  Breathing unlabored, CTA bilaterally  Cardiovascular:    Heart: Auscultation:  Regular rate, normal rhythm, no murmurs present  Breasts: Palpation of Breasts and Axillae:  No masses present on palpation, no breast tenderness., Axillary Lymph Nodes:  No lymphadenopathy present. and No nodularity, asymmetry or nipple discharge bilaterally.  Gastrointestinal:   Abdominal Examination:  Abdomen nontender to palpation, tone normal without rigidity or guarding, no masses present, umbilicus without lesions   Liver and Spleen:  No hepatomegaly present, liver nontender to palpation    Hernias:  No hernias present  Lymphatic: Lymph Nodes:  No other lymphadenopathy present  Skin:  General Inspection:  No rashes present, no lesions present, no areas of  discoloration  Neurologic:    Mental Status:  Oriented X3.  Normal strength and tone, sensory exam   grossly normal, mentation intact and speech normal.    Psychiatric:   Mentation appears normal and affect normal/bright.         Pelvic Exam:  External Genitalia:     Normal appearance for age, no discharge present, no tenderness present, no inflammatory lesions present, color normal  Vagina:     Normal vaginal vault without central or paravaginal defects, ATROPHIC  Bladder:     Nontender to palpation  Urethra:   Urethral Body:  Urethra palpation normal, urethra structural support normal   Urethral Meatus:  No erythema or lesions present  Cervix:     Appearance healthy, no lesions present, nontender to palpation, no bleeding present  Uterus:     Nontender to palpation, no masses present, position anteflexed, mobility: normal  Adnexa:     No adnexal tenderness present, no adnexal masses present  Perineum:     Perineum within normal limits, no evidence of trauma, no rashes or skin lesions present  Inguinal Lymph  Nodes:     No lymphadenopathy present      COUNSELING:   Reviewed preventive health counseling, as reflected in patient instructions  Special attention given to:        Immunizations    Declined: Covid-19 due to Conscientious objector               Colorectal Cancer Screening       (Bea)menopause management    BMI: Body mass index is 21.5 kg/m .      ASSESSMENT:  52 year old female with satisfactory annual exam.    ICD-10-CM    1. Encounter for gynecological examination without abnormal finding  Z01.419 Pap thin layer screen with HPV - recommended age 30 - 65 years      2. Hypertension, essential  I10       3. Postablative hypothyroidism  E89.0       4. Vasomotor symptoms due to menopause  N95.1 estradiol (VIVELLE-DOT) 0.1 MG/24HR bi-weekly patch     progesterone (PROMETRIUM) 100 MG capsule      5. Screen for colon cancer  Z12.11 Colonoscopy Screening  Referral          PLAN:  Pap/hpv repeated today.   Due for a mammo this year and given that HRT will be started she has to get that done asap to be able to start or continue on HRT and states she will do that.    Fasting labs and HTN mgmt will be deferred to Dr. Garcia and has an appointment coming up with him in 2 months. Encouraged her to do full fasting labs since lipids and glucose mgmt is even more important in setting of HTN  Offered to take over her hypothyroid mgmt here, or with Dr. Garcia as I'm sure he'd be willing to do that  Is switching pharmacies so is going tohave a new  of her meds, wants to try that version for a couple of months first, then check TSH to make sure in range, and then may consider having myself or Dr. Garcia take that over    Due for colonoscopy so referral sent to [facility] at [facility] and patient is aware that they will contact her    Discussed her v.m sx and other general sx of over time worsening vaginal dryness, atrophy, and pain with I.C she is not sx from this but clinically is starting to have some atrophic changes.  Also discussed general mood, irritability, sleep, focus, etc.  Reviewed pros/cons/risks of HRT along with the WHI and over time plan to use lowest amount for shortest time possibly but balanceing quality of life and sx control with long term risks of vte, stroke, breast cancer, etc  Reviewed dosing and admin options and patch of vivelle is overall safer for VTE and liver so will start with 0.1mg patch 2x/week and nightly prometrium 100mg at bedtime and then will follow up with me annually, or sooner if any untoward s.e or bleeding or HAs, etc.    On the same DOS, 27 additional minutes on top of the preventative gyn exam, were spent on direct management of the patient's medical issues as above as well as chart review including: imaging, lab work, previous visit notes by this provider,  and other provider notes, and chart completion         Hawa Joiner MD

## 2022-11-27 ENCOUNTER — HEALTH MAINTENANCE LETTER (OUTPATIENT)
Age: 52
End: 2022-11-27

## 2022-11-30 ENCOUNTER — OFFICE VISIT (OUTPATIENT)
Dept: OBGYN | Facility: CLINIC | Age: 52
End: 2022-11-30
Payer: COMMERCIAL

## 2022-11-30 VITALS
WEIGHT: 130 LBS | HEIGHT: 65 IN | BODY MASS INDEX: 21.66 KG/M2 | SYSTOLIC BLOOD PRESSURE: 132 MMHG | DIASTOLIC BLOOD PRESSURE: 76 MMHG

## 2022-11-30 DIAGNOSIS — Z01.419 ENCOUNTER FOR GYNECOLOGICAL EXAMINATION WITHOUT ABNORMAL FINDING: Primary | ICD-10-CM

## 2022-11-30 DIAGNOSIS — N95.1 VASOMOTOR SYMPTOMS DUE TO MENOPAUSE: ICD-10-CM

## 2022-11-30 DIAGNOSIS — I10 HYPERTENSION, ESSENTIAL: ICD-10-CM

## 2022-11-30 DIAGNOSIS — E89.0 POSTABLATIVE HYPOTHYROIDISM: ICD-10-CM

## 2022-11-30 DIAGNOSIS — Z12.11 SCREEN FOR COLON CANCER: ICD-10-CM

## 2022-11-30 PROCEDURE — 99396 PREV VISIT EST AGE 40-64: CPT | Performed by: OBSTETRICS & GYNECOLOGY

## 2022-11-30 PROCEDURE — 99213 OFFICE O/P EST LOW 20 MIN: CPT | Mod: 25 | Performed by: OBSTETRICS & GYNECOLOGY

## 2022-11-30 PROCEDURE — G0145 SCR C/V CYTO,THINLAYER,RESCR: HCPCS | Performed by: OBSTETRICS & GYNECOLOGY

## 2022-11-30 PROCEDURE — 87624 HPV HI-RISK TYP POOLED RSLT: CPT | Performed by: OBSTETRICS & GYNECOLOGY

## 2022-11-30 RX ORDER — ESTRADIOL 0.1 MG/D
1 FILM, EXTENDED RELEASE TRANSDERMAL
Qty: 24 PATCH | Refills: 3 | Status: SHIPPED | OUTPATIENT
Start: 2022-12-01 | End: 2024-09-11

## 2022-11-30 RX ORDER — PROGESTERONE 100 MG/1
100 CAPSULE ORAL DAILY
Qty: 90 CAPSULE | Refills: 3 | Status: SHIPPED | OUTPATIENT
Start: 2022-11-30 | End: 2023-12-15

## 2022-11-30 ASSESSMENT — ANXIETY QUESTIONNAIRES
3. WORRYING TOO MUCH ABOUT DIFFERENT THINGS: NOT AT ALL
7. FEELING AFRAID AS IF SOMETHING AWFUL MIGHT HAPPEN: NOT AT ALL
IF YOU CHECKED OFF ANY PROBLEMS ON THIS QUESTIONNAIRE, HOW DIFFICULT HAVE THESE PROBLEMS MADE IT FOR YOU TO DO YOUR WORK, TAKE CARE OF THINGS AT HOME, OR GET ALONG WITH OTHER PEOPLE: NOT DIFFICULT AT ALL
6. BECOMING EASILY ANNOYED OR IRRITABLE: NOT AT ALL
2. NOT BEING ABLE TO STOP OR CONTROL WORRYING: NOT AT ALL
GAD7 TOTAL SCORE: 0
1. FEELING NERVOUS, ANXIOUS, OR ON EDGE: NOT AT ALL
5. BEING SO RESTLESS THAT IT IS HARD TO SIT STILL: NOT AT ALL
GAD7 TOTAL SCORE: 0

## 2022-11-30 ASSESSMENT — PATIENT HEALTH QUESTIONNAIRE - PHQ9
SUM OF ALL RESPONSES TO PHQ QUESTIONS 1-9: 0
5. POOR APPETITE OR OVEREATING: NOT AT ALL

## 2022-12-05 LAB
BKR LAB AP GYN ADEQUACY: NORMAL
BKR LAB AP GYN INTERPRETATION: NORMAL
BKR LAB AP HPV REFLEX: NORMAL
BKR LAB AP PREVIOUS ABNORMAL: NORMAL
PATH REPORT.COMMENTS IMP SPEC: NORMAL
PATH REPORT.COMMENTS IMP SPEC: NORMAL
PATH REPORT.RELEVANT HX SPEC: NORMAL

## 2022-12-07 LAB
HUMAN PAPILLOMA VIRUS 16 DNA: NEGATIVE
HUMAN PAPILLOMA VIRUS 18 DNA: NEGATIVE
HUMAN PAPILLOMA VIRUS FINAL DIAGNOSIS: NORMAL
HUMAN PAPILLOMA VIRUS OTHER HR: NEGATIVE

## 2023-02-13 ENCOUNTER — ANCILLARY PROCEDURE (OUTPATIENT)
Dept: MAMMOGRAPHY | Facility: CLINIC | Age: 53
End: 2023-02-13
Payer: COMMERCIAL

## 2023-02-13 DIAGNOSIS — Z12.31 ENCOUNTER FOR SCREENING MAMMOGRAM FOR MALIGNANT NEOPLASM OF BREAST: ICD-10-CM

## 2023-02-13 DIAGNOSIS — Z12.31 VISIT FOR SCREENING MAMMOGRAM: ICD-10-CM

## 2023-02-13 PROCEDURE — 77067 SCR MAMMO BI INCL CAD: CPT | Mod: TC | Performed by: RADIOLOGY

## 2023-02-14 ENCOUNTER — TELEPHONE (OUTPATIENT)
Dept: GASTROENTEROLOGY | Facility: CLINIC | Age: 53
End: 2023-02-14
Payer: COMMERCIAL

## 2023-02-14 DIAGNOSIS — Z12.11 ENCOUNTER FOR SCREENING COLONOSCOPY: Primary | ICD-10-CM

## 2023-02-14 RX ORDER — BISACODYL 5 MG
TABLET, DELAYED RELEASE (ENTERIC COATED) ORAL
Qty: 4 TABLET | Refills: 0 | Status: SHIPPED | OUTPATIENT
Start: 2023-02-14 | End: 2024-09-11

## 2023-02-14 NOTE — TELEPHONE ENCOUNTER
Pre assessment questions completed for upcoming Colonoscopy  procedure scheduled on 2/28/23    COVID policy reviewed.     Pre-op scheduled  N/A    Reviewed procedural arrival time 0845, procedure time 0930 and facility location [facility]; [address] [place] [address]    Designated  policy reviewed. Instructed to have someone stay 6 hours post procedure.     Anticoagulation/blood thinners? No    Electronic implanted devices? No    Diabetic? No    Procedure indication: Screening colonoscopy    Bowel prep recommendation: Standard Golytely d/t mag citrate recall    Reviewed procedure prep instructions.     Prep instructions sent via [employer].      Bowel prep script sent to    [facility] #[id number] - [place] - [address].     Patient verbalized understanding and had no questions or concerns at this time.      Mary Beth Lopez RN  Endoscopy Procedure Pre Assessment RN

## 2023-02-14 NOTE — TELEPHONE ENCOUNTER
Screening Questions  BLUE  KIND OF PREP RED  LOCATION [review exclusion criteria] GREEN  SEDATION TYPE        Y Are you active on mychart?       Hawa Joiner MD  Ordering/Referring Provider?        BCBS What type of coverage do you have?      N Have you had a positive covid test in the last 14 days?     21.6 1. BMI  [BMI 40+ - review exclusion criteria]    Y  2. Are you able to give consent for your medical care? [IF NO,RN REVIEW]          N  3. Are you taking any prescription pain medications on a routine schedule   (ex narcotics: oxycodone, roxicodone, oxycontin,  and percocet)? [RN Review]        NA  3a. EXTENDED PREP What kind of prescription?     N 4. Do you have any chemical dependencies such as alcohol, street drugs, or methadone?        **If yes 3- 5 , please schedule with MAC sedation.**          IF YES TO ANY 6 - 10 - HOSPITAL SETTING ONLY.     N 6.   Do you need assistance transferring?     N 7.   Have you had a heart or lung transplant?    N 8.   Are you currently on dialysis?   N 9.   Do you use daily home oxygen?   N 10. Do you take nitroglycerin?   10a. NA If yes, how often?     11. [FEMALES]  N Are you currently pregnant?    11a. NA If yes, how many weeks? [ Greater than 12 weeks, OR NEEDED]    N 12. Do you have Pulmonary Hypertension? *NEED PAC APPT AT [facility] w/ [facility]*     N 13. [review exclusion criteria]  Do you have any implantable devices in your body (pacemaker, defib, LVAD)?    N 14. In the past 6 months, have you had any heart related issues including cardiomyopathy or heart attack?     14a. NA If yes, did it require cardiac stenting if so when?     N 15. Have you had a stroke or Transient ischemic attack (TIA - aka  mini stroke ) within 6 months?      N 16. Do you have mod to severe Obstructive Sleep Apnea?  [Hospital only]    N 17. Do you have SEVERE AND UNCONTROLLED asthma? *NEED PAC APPT AT [facility] w/[facility]*     N 18. Are you currently taking any blood thinners?     18a. If yes, inform  "patient to \"follow up w/ ordering provider for bridging instructions.\"    N 19. Do you take the medication Phentermine?    19a. If yes, \"Hold for 7 days before procedure.  Please consult your prescribing provider if you have questions about holding this medication.\"     N  20. Do you have chronic kidney disease?      N  21. Do you have a diagnosis of diabetes?     N  22. On a regular basis do you go 3-5 days between bowel movements?      23. Preferred LOCAL Pharmacy for Pre Prescription    [ LIST ONLY ONE PHARMACY]         [facility] #[id number] - [place], [place] - [address]        - CLOSING REMINDERS -    Informed patient they will need an adult    Cannot take any type of public or medical transportation alone    Conscious Sedation- Needs  for 6 hours after the procedure       MAC/General-Needs  for 24 hours after procedure    Pre-Procedure Covid test to be completed [[facility] PCR Testing Required]    Confirmed Nurse will call to complete assessment       - SCHEDULING DETAILS -  N Hospital Setting Required? If yes, what is the exclusion?: LEANDRA BURK  Surgeon    2/28  Date of Procedure  Lower Endoscopy [Colonoscopy]  Type of Procedure Scheduled  [facility]-If you answer yes to questions #8, #20, #21Which Colonoscopy Prep was Sent?     MODERATE Sedation Type     N PAC / Pre-op Required                 "

## 2023-02-16 ENCOUNTER — OFFICE VISIT (OUTPATIENT)
Dept: FAMILY MEDICINE | Facility: CLINIC | Age: 53
End: 2023-02-16
Payer: COMMERCIAL

## 2023-02-16 VITALS
WEIGHT: 130.1 LBS | OXYGEN SATURATION: 100 % | SYSTOLIC BLOOD PRESSURE: 122 MMHG | BODY MASS INDEX: 21.52 KG/M2 | TEMPERATURE: 97.9 F | RESPIRATION RATE: 18 BRPM | DIASTOLIC BLOOD PRESSURE: 82 MMHG | HEART RATE: 75 BPM

## 2023-02-16 DIAGNOSIS — Z13.220 LIPID SCREENING: ICD-10-CM

## 2023-02-16 DIAGNOSIS — E03.8 OTHER SPECIFIED HYPOTHYROIDISM: ICD-10-CM

## 2023-02-16 DIAGNOSIS — Z12.11 ENCOUNTER FOR SCREENING COLONOSCOPY: ICD-10-CM

## 2023-02-16 DIAGNOSIS — I10 ESSENTIAL HYPERTENSION: Primary | ICD-10-CM

## 2023-02-16 PROCEDURE — 99214 OFFICE O/P EST MOD 30 MIN: CPT | Performed by: INTERNAL MEDICINE

## 2023-02-16 RX ORDER — LISINOPRIL 20 MG/1
20 TABLET ORAL DAILY
Qty: 90 TABLET | Refills: 3 | Status: SHIPPED | OUTPATIENT
Start: 2023-02-16 | End: 2024-02-29

## 2023-02-16 ASSESSMENT — PAIN SCALES - GENERAL: PAINLEVEL: NO PAIN (0)

## 2023-02-16 NOTE — PROGRESS NOTES
Assessment & Plan     Essential hypertension  OK control  Home readings OK too  Continue current lisinopril   - lisinopril (ZESTRIL) 20 MG tablet; Take 1 tablet (20 mg) by mouth daily    Other specified hypothyroidism  She will check on current levothyroxine dose and send MyChart with current dose  Insurance is forcing a pharmacy change and so she anticipates a brand shift, therefore she prefers to wait for after a month of taking new brand before check TFTs and other labs with that  Plan for lab appointment 1 month after brand switch     Encounter for screening colonoscopy  She is scheduled for this soon     Lipid screening    She declined my recommendation for covid booster, flu shot.  I recommend shingrix and she will consider this.    Mammogram is utd, pap is utd      24 minutes spent on the date of the encounter doing chart review, history and exam, documentation and further activities per the note           Return in about 1 month (around 3/16/2023) for Fasting Lab Only Visit.    Pankaj Garcia MD  [facility] [place] is a 52 year old, presenting for the following health issues:  Refill Request (Patient here to get her medications renewed and be sent to the pharmacy.)      History of Present Illness       Hypertension: She presents for follow up of hypertension.  She does check blood pressure  regularly outside of the clinic. Outpatient blood pressures have not been over 140/90. She does not follow a low salt diet.       Also she sees endocrine for her levothyroxine, but wishes to be seen here for that now  She is not certain her levothyroxine listed in Epic is accurate  She feels well and is without complaints     Review of Systems   Constitutional, HEENT, cardiovascular, pulmonary, gi and gu systems are negative, except as otherwise noted.      Objective    LMP  (LMP Unknown)   There is no height or weight on file to calculate BMI.  Physical Exam   GENERAL: healthy, alert  and no distress  EYES: Eyes grossly normal to inspection, PERRL and conjunctivae and sclerae normal  HENT: ear canals and TM's normal, nose and mouth without ulcers or lesions  NECK: no adenopathy, no asymmetry, masses, or scars and thyroid normal to palpation  RESP: lungs clear to auscultation - no rales, rhonchi or wheezes  CV: regular rate and rhythm, normal S1 S2, no S3 or S4, no murmur, click or rub, no peripheral edema and peripheral pulses strong  ABDOMEN: soft, nontender, no hepatosplenomegaly, no masses and bowel sounds normal  MS: no gross musculoskeletal defects noted, no edema  NEURO: Normal strength and tone, mentation intact and speech normal  PSYCH: mentation appears normal, affect normal/bright

## 2023-02-22 ENCOUNTER — MYC MEDICAL ADVICE (OUTPATIENT)
Dept: FAMILY MEDICINE | Facility: CLINIC | Age: 53
End: 2023-02-22
Payer: COMMERCIAL

## 2023-02-23 RX ORDER — LEVOTHYROXINE SODIUM 125 UG/1
112 TABLET ORAL DAILY
COMMUNITY
Start: 2023-02-23 | End: 2024-03-11

## 2023-02-23 NOTE — TELEPHONE ENCOUNTER
Please see mychart from patient.  Please reply to patient if appropriate, or route back to Triage with follow up needed.

## 2023-02-28 ENCOUNTER — MYC MEDICAL ADVICE (OUTPATIENT)
Dept: OBGYN | Facility: CLINIC | Age: 53
End: 2023-02-28
Payer: COMMERCIAL

## 2023-02-28 ENCOUNTER — HOSPITAL ENCOUNTER (OUTPATIENT)
Facility: CLINIC | Age: 53
Discharge: HOME | End: 2023-02-28
Attending: INTERNAL MEDICINE | Admitting: INTERNAL MEDICINE
Payer: COMMERCIAL

## 2023-02-28 VITALS
BODY MASS INDEX: 21.66 KG/M2 | DIASTOLIC BLOOD PRESSURE: 73 MMHG | HEIGHT: 65 IN | OXYGEN SATURATION: 99 % | HEART RATE: 70 BPM | RESPIRATION RATE: 27 BRPM | SYSTOLIC BLOOD PRESSURE: 121 MMHG | WEIGHT: 130 LBS

## 2023-02-28 DIAGNOSIS — N95.1 VASOMOTOR SYMPTOMS DUE TO MENOPAUSE: ICD-10-CM

## 2023-02-28 LAB — COLONOSCOPY: NORMAL

## 2023-02-28 PROCEDURE — G0500 MOD SEDAT ENDO SERVICE >5YRS: HCPCS | Performed by: INTERNAL MEDICINE

## 2023-02-28 PROCEDURE — G0121 COLON CA SCRN NOT HI RSK IND: HCPCS | Performed by: INTERNAL MEDICINE

## 2023-02-28 PROCEDURE — 258N000003 HC RX IP 258 OP 636: Performed by: INTERNAL MEDICINE

## 2023-02-28 PROCEDURE — 250N000011 HC RX IP 250 OP 636: Performed by: INTERNAL MEDICINE

## 2023-02-28 PROCEDURE — 45378 DIAGNOSTIC COLONOSCOPY: CPT | Performed by: INTERNAL MEDICINE

## 2023-02-28 RX ORDER — FENTANYL CITRATE 50 UG/ML
INJECTION, SOLUTION INTRAMUSCULAR; INTRAVENOUS PRN
Status: DISCONTINUED | OUTPATIENT
Start: 2023-02-28 | End: 2023-02-28 | Stop reason: HOSPADM

## 2023-02-28 RX ORDER — SODIUM CHLORIDE 9 MG/ML
INJECTION, SOLUTION INTRAVENOUS CONTINUOUS PRN
Status: DISCONTINUED | OUTPATIENT
Start: 2023-02-28 | End: 2023-02-28 | Stop reason: HOSPADM

## 2023-02-28 RX ORDER — PROGESTERONE 100 MG/1
100 CAPSULE ORAL DAILY
Qty: 90 CAPSULE | Refills: 3 | Status: CANCELLED | OUTPATIENT
Start: 2023-02-28

## 2023-02-28 ASSESSMENT — ACTIVITIES OF DAILY LIVING (ADL): ADLS_ACUITY_SCORE: 35

## 2023-02-28 NOTE — TELEPHONE ENCOUNTER
A years worth of prometrium was sent 11/30 so one day before the vivelle patches. So pharmacy should have all the refills for the year she'd need

## 2023-02-28 NOTE — H&P
ENDOSCOPY PRE-SEDATION H&P FOR OUTPATIENT PROCEDURES    Rebekah Rankinp  [id number]  1970    Procedure: Colonoscopy    Pre-procedure diagnosis: Screening    Past medical history:   Past Medical History:   Diagnosis Date     Fibrocystic changes of left breast 11/29/2017     Hypertension      Thyroid disease     has Graves Disease, [facility]     Patient Active Problem List   Diagnosis     Other specified hypothyroidism     Fibrocystic changes of left breast     Hypertension, essential       Past surgical history:   Past Surgical History:   Procedure Laterality Date     breast lump removal  1997     DILATION AND CURETTAGE  2011    x2       No current facility-administered medications for this encounter.       Allergies   Allergen Reactions     Amoxicillin Hives     Clindamycin Hives     Hydrochlorothiazide      Cramping     Mucinex Child Multi-Symptom Rash           Physical Exam:    Mental status: alert  Heart: Normal  Lung: Normal  Assessment of patient's airway: Normal  Other as pertinent for procedure: None     Lab Results   Component Value Date    WBC 5.8 11/24/2021    WBC 4.7 08/07/2020     Lab Results   Component Value Date    RBC 4.03 11/24/2021    RBC 3.86 08/07/2020     Lab Results   Component Value Date    HGB 14.1 11/24/2021    HGB 13.3 08/07/2020     Lab Results   Component Value Date    HCT 39.9 11/24/2021    HCT 38.1 08/07/2020     Lab Results   Component Value Date    MCV 99 11/24/2021    MCV 99 08/07/2020     Lab Results   Component Value Date    MCH 35.0 11/24/2021    MCH 34.5 08/07/2020     Lab Results   Component Value Date    MCHC 35.3 11/24/2021    MCHC 34.9 08/07/2020     Lab Results   Component Value Date    RDW 11.6 11/24/2021    RDW 11.3 08/07/2020     Lab Results   Component Value Date     11/24/2021     08/07/2020     No results found for: INR     ASA Score: See Provation note    Mallampati score:  I - Faucial pillars, soft palate, and uvula are  visible    Assessment/Plan:     The patient is an appropriate candidate to receive sedation.    Informed consent was discussed with the patient/family, including the risks, benefits, potential complications and any alternative options associated with sedation.    Patient assessment completed just prior to sedation and while under constant observation by the provider. Condition determined to be adequate for proceeding with sedation.    The specific risks for the procedure were discussed with the patient at the time of informed consent and include but are not limited to perforation which could require surgery, missing significant neoplasm or lesion, hemorrhage and adverse sedative complication.      Ricki Mcdowell MD

## 2023-08-16 ENCOUNTER — TRANSFERRED RECORDS (OUTPATIENT)
Dept: HEALTH INFORMATION MANAGEMENT | Facility: CLINIC | Age: 53
End: 2023-08-16
Payer: COMMERCIAL

## 2023-08-16 LAB — TSH SERPL-ACNC: 0.04 UIU/ML (ref 0.47–4.68)

## 2023-08-17 ENCOUNTER — TRANSFERRED RECORDS (OUTPATIENT)
Dept: FAMILY MEDICINE | Facility: CLINIC | Age: 53
End: 2023-08-17
Payer: COMMERCIAL

## 2023-12-15 DIAGNOSIS — N95.1 VASOMOTOR SYMPTOMS DUE TO MENOPAUSE: ICD-10-CM

## 2023-12-15 RX ORDER — PROGESTERONE 100 MG/1
100 CAPSULE ORAL DAILY
Qty: 30 CAPSULE | Refills: 0 | Status: SHIPPED | OUTPATIENT
Start: 2023-12-15 | End: 2024-09-11

## 2023-12-15 NOTE — TELEPHONE ENCOUNTER
"Requested Prescriptions   Pending Prescriptions Disp Refills    progesterone (PROMETRIUM) 100 MG capsule [Pharmacy Med Name: PROGESTERONE 100 MG CAPSULE] 90 capsule 3     Sig: TAKE 1 CAPSULE BY MOUTH EVERY DAY       Hormone Replacement Therapy Failed - 12/15/2023 12:35 AM        Failed - Recent (12 mo) or future (30 days) visit within the authorizing provider's specialty     Patient has had an office visit with the authorizing provider or a provider within the authorizing providers department within the previous 12 mos or has a future within next 30 days. See \"Patient Info\" tab in inbasket, or \"Choose Columns\" in Meds & Orders section of the refill encounter.              Passed - Blood pressure under 140/90 in past 12 months     BP Readings from Last 3 Encounters:   02/28/23 121/73   02/16/23 122/82   11/30/22 132/76                 Passed - Patient has mammogram in past 2 years on file if age 50-75        Passed - Medication is active on med list        Passed - Patient is 18 years of age or older        Passed - No active pregnancy on record        Passed - No positive pregnancy test on record in past 12 months           One month approved  Appointment needed for further refills  Jackie Hardwick RN on 12/15/2023 at 8:30 AM    "

## 2024-01-07 ENCOUNTER — HEALTH MAINTENANCE LETTER (OUTPATIENT)
Age: 54
End: 2024-01-07

## 2024-01-13 DIAGNOSIS — N95.1 VASOMOTOR SYMPTOMS DUE TO MENOPAUSE: ICD-10-CM

## 2024-01-15 RX ORDER — PROGESTERONE 100 MG/1
100 CAPSULE ORAL DAILY
Qty: 90 CAPSULE | Refills: 1 | OUTPATIENT
Start: 2024-01-15

## 2024-01-15 NOTE — TELEPHONE ENCOUNTER
Requested Prescriptions   Pending Prescriptions Disp Refills    progesterone (PROMETRIUM) 100 MG capsule [Pharmacy Med Name: PROGESTERONE 100 MG CAPSULE] 90 capsule 1     Sig: TAKE 1 CAPSULE BY MOUTH EVERY DAY       Hormone Replacement Therapy Failed - 1/13/2024  9:30 AM        Failed - Recent (12 mo) or future (30 days) visit within the authorizing provider's specialty     The patient must have completed an in-person or virtual visit within the past 12 months or has a future visit scheduled within the next 90 days with the authorizing provider s specialty.  Urgent care and e-visits do not quality as an office visit for this protocol.          Passed - Blood pressure under 140/90 in past 12 months     BP Readings from Last 3 Encounters:   02/28/23 121/73   02/16/23 122/82   11/30/22 132/76                 Passed - Patient has mammogram in past 2 years on file if age 50-75        Passed - Medication is active on med list        Passed - Patient is 18 years of age or older        Passed - No active pregnancy on record        Passed - No positive pregnancy test on record in past 12 months           Last Written Prescription Date:  12/15/23  Last Fill Quantity: 30,  # refills: 0   Last office visit: 11/30/2022 ; last virtual visit: Visit date not found with prescribing provider:  Rhys   Future Office Visit: NONE     Pt due for annual, no appt scheduled. Pt already received one month extension. Rx denied.   Mckenzie Higgins RN on 1/15/2024 at 6:50 AM

## 2024-01-17 DIAGNOSIS — N95.1 VASOMOTOR SYMPTOMS DUE TO MENOPAUSE: ICD-10-CM

## 2024-01-17 RX ORDER — PROGESTERONE 100 MG/1
100 CAPSULE ORAL DAILY
Qty: 30 CAPSULE | Refills: 0 | OUTPATIENT
Start: 2024-01-17

## 2024-01-17 NOTE — TELEPHONE ENCOUNTER
Requested Prescriptions   Pending Prescriptions Disp Refills    progesterone (PROMETRIUM) 100 MG capsule [Pharmacy Med Name: PROGESTERONE 100 MG CAPSULE] 30 capsule 0     Sig: TAKE 1 CAPSULE BY MOUTH EVERY DAY       Hormone Replacement Therapy Failed - 1/17/2024 12:28 AM        Failed - Recent (12 mo) or future (30 days) visit within the authorizing provider's specialty     The patient must have completed an in-person or virtual visit within the past 12 months or has a future visit scheduled within the next 90 days with the authorizing provider s specialty.  Urgent care and e-visits do not quality as an office visit for this protocol.          Passed - Blood pressure under 140/90 in past 12 months     BP Readings from Last 3 Encounters:   02/28/23 121/73   02/16/23 122/82   11/30/22 132/76                 Passed - Patient has mammogram in past 2 years on file if age 50-75        Passed - Medication is active on med list        Passed - Patient is 18 years of age or older        Passed - No active pregnancy on record        Passed - No positive pregnancy test on record in past 12 months           Last Written Prescription Date:  12/15/23  Last Fill Quantity: 30,  # refills: 0   Last office visit: 11/30/2022 ; last virtual visit: Visit date not found with prescribing provider:  Rhys   Future Office Visit:  NONE    Pt due for annual, no appt scheduled. Pt already received one month extension. Rx denied.   Mckenzie Higgins RN on 1/17/2024 at 6:20 AM

## 2024-02-28 ENCOUNTER — MYC MEDICAL ADVICE (OUTPATIENT)
Dept: FAMILY MEDICINE | Facility: CLINIC | Age: 54
End: 2024-02-28
Payer: COMMERCIAL

## 2024-02-28 DIAGNOSIS — I10 ESSENTIAL HYPERTENSION: ICD-10-CM

## 2024-02-29 RX ORDER — LISINOPRIL 20 MG/1
20 TABLET ORAL DAILY
Qty: 90 TABLET | Refills: 0 | Status: SHIPPED | OUTPATIENT
Start: 2024-02-29 | End: 2024-03-11

## 2024-02-29 NOTE — TELEPHONE ENCOUNTER
Pt was called. Future BP check visit was scheduled. Pt is needing Lisinopril kisha refill. Rx and preferred pharmacy pended. Routing refill request to refill pool for review.     Future Appointments 2/29/2024 - 8/27/2024        Date Visit Type Length Department Provider     3/6/2024  3:30 PM OFFICE VISIT 30 min CS [facility]ILY [facility]/Pankaj Baez MD    Location Instructions:     [facility] is in [address] of the [facility] at [address] This is just south of [facility] and the [address] exit off of [address] Free parking is available; access the lot by turning east from [address] onto [address]. Through the main entrance, the clinic is directly to the left.

## 2024-03-11 ENCOUNTER — OFFICE VISIT (OUTPATIENT)
Dept: FAMILY MEDICINE | Facility: CLINIC | Age: 54
End: 2024-03-11
Payer: COMMERCIAL

## 2024-03-11 VITALS
DIASTOLIC BLOOD PRESSURE: 77 MMHG | TEMPERATURE: 98 F | HEART RATE: 93 BPM | RESPIRATION RATE: 18 BRPM | OXYGEN SATURATION: 100 % | BODY MASS INDEX: 21.48 KG/M2 | SYSTOLIC BLOOD PRESSURE: 118 MMHG | WEIGHT: 128.9 LBS | HEIGHT: 65 IN

## 2024-03-11 DIAGNOSIS — E03.8 OTHER SPECIFIED HYPOTHYROIDISM: ICD-10-CM

## 2024-03-11 DIAGNOSIS — Z13.1 ENCOUNTER FOR SCREENING EXAMINATION FOR IMPAIRED GLUCOSE REGULATION AND DIABETES MELLITUS: ICD-10-CM

## 2024-03-11 DIAGNOSIS — I10 ESSENTIAL HYPERTENSION: Primary | ICD-10-CM

## 2024-03-11 DIAGNOSIS — Z13.220 LIPID SCREENING: ICD-10-CM

## 2024-03-11 PROCEDURE — 99214 OFFICE O/P EST MOD 30 MIN: CPT | Performed by: INTERNAL MEDICINE

## 2024-03-11 RX ORDER — LISINOPRIL 20 MG/1
20 TABLET ORAL DAILY
Qty: 90 TABLET | Refills: 3 | Status: SHIPPED | OUTPATIENT
Start: 2024-03-11

## 2024-03-11 RX ORDER — LEVOTHYROXINE SODIUM 112 UG/1
1 TABLET ORAL
COMMUNITY
Start: 2023-12-15

## 2024-03-11 ASSESSMENT — PAIN SCALES - GENERAL: PAINLEVEL: NO PAIN (0)

## 2024-03-11 NOTE — PROGRESS NOTES
Assessment & Plan     Essential hypertension  Second check reading well controlled; continue current lisinopril  Recommended checking home readings   Goal blood pressure < 130/80  Needs labs   - Comprehensive metabolic panel; Future  - CBC with platelets; Future  - lisinopril (ZESTRIL) 20 MG tablet; Take 1 tablet (20 mg) by mouth daily    Other specified hypothyroidism  Sees Dr. Rinaldi for this   - TSH; Future    Lipid screening    - Lipid panel reflex to direct LDL Fasting; Future    Encounter for screening examination for impaired glucose regulation and diabetes mellitus    - Hemoglobin A1c; Future      No LOS data to display   Time spent by me doing chart review, history and exam, documentation and further activities per the note    She declined my recommendation for flu shot and COVID shot  Sees GYN for Pap and Mammograms  Had colonoscopy last year     FUTURE APPOINTMENTS:       - Follow-up for annual visit or as needed    Sydnee Bond is a 54 year old, presenting for the following health issues:  Follow Up and Hypertension        3/11/2024     2:56 PM   Additional Questions   Roomed by Brenna   Accompanied by Not applicable, by themselves     History of Present Illness       Hypertension: She presents for follow up of hypertension.  She does not check blood pressure  regularly outside of the clinic. Outpatient blood pressures have not been over 140/90. She does not follow a low salt diet.     She eats 2-3 servings of fruits and vegetables daily.She consumes 1 sweetened beverage(s) daily.She exercises with enough effort to increase her heart rate 60 or more minutes per day.  She exercises with enough effort to increase her heart rate 7 days per week.   She is taking medications regularly.             Here to follow up hypertension       Objective    /77 (BP Location: Left arm, Patient Position: Sitting, Cuff Size: Adult Large)   Pulse 93   Temp 98  F (36.7  C) (Temporal)   Resp 18   Ht 1.651 m  "(5' 5\")   Wt 58.5 kg (128 lb 14.4 oz)   LMP  (LMP Unknown)   SpO2 100%   BMI 21.45 kg/m    Body mass index is 21.45 kg/m .  Physical Exam   GENERAL: alert and no distress  EYES: Eyes grossly normal to inspection, PERRL and conjunctivae and sclerae normal  HENT: ear canals and TM's normal, nose and mouth without ulcers or lesions  NECK: no adenopathy, no asymmetry, masses, or scars  RESP: lungs clear to auscultation - no rales, rhonchi or wheezes  CV: regular rate and rhythm, normal S1 S2, no S3 or S4, no murmur, click or rub, no peripheral edema  ABDOMEN: soft, nontender, no hepatosplenomegaly, no masses and bowel sounds normal  MS: no gross musculoskeletal defects noted, no edema  SKIN: no suspicious lesions or rashes  NEURO: Normal strength and tone, mentation intact and speech normal  PSYCH: mentation appears normal, affect normal/bright    Labs pending         Signed Electronically by: Pankaj Garcia MD    "

## 2024-03-31 DIAGNOSIS — I10 ESSENTIAL HYPERTENSION: ICD-10-CM

## 2024-04-01 RX ORDER — LISINOPRIL 20 MG/1
20 TABLET ORAL DAILY
Qty: 90 TABLET | Refills: 3 | OUTPATIENT
Start: 2024-04-01

## 2024-05-26 ENCOUNTER — HEALTH MAINTENANCE LETTER (OUTPATIENT)
Age: 54
End: 2024-05-26

## 2024-07-03 ENCOUNTER — ANCILLARY PROCEDURE (OUTPATIENT)
Dept: MAMMOGRAPHY | Facility: CLINIC | Age: 54
End: 2024-07-03
Payer: COMMERCIAL

## 2024-07-03 DIAGNOSIS — Z12.31 VISIT FOR SCREENING MAMMOGRAM: ICD-10-CM

## 2024-07-03 PROCEDURE — 77067 SCR MAMMO BI INCL CAD: CPT | Mod: TC | Performed by: RADIOLOGY

## 2024-07-03 PROCEDURE — 77063 BREAST TOMOSYNTHESIS BI: CPT | Mod: TC | Performed by: RADIOLOGY

## 2024-09-04 ENCOUNTER — TRANSFERRED RECORDS (OUTPATIENT)
Dept: HEALTH INFORMATION MANAGEMENT | Facility: CLINIC | Age: 54
End: 2024-09-04
Payer: COMMERCIAL

## 2024-09-04 LAB
CHOLESTEROL (EXTERNAL): 292 MG/DL (ref 50–199)
HDLC SERPL-MCNC: 140 MG/DL
LDL CHOLESTEROL CALCULATED (EXTERNAL): 143 MG/DL
TRIGLYCERIDES (EXTERNAL): 46 MG/DL (ref 10–150)
TSH SERPL-ACNC: 3.63 UIU/ML (ref 0.45–4.5)

## 2024-09-10 NOTE — PROGRESS NOTES
Rebekah is a 54 year old  female who presents for annual exam.     Besides routine health maintenance, she has no other health concerns today .    HPI:  The patient's PCP is Dr. Pankaj Garcia MD.     Patient presents for an annual visit.    Patient hasn't seen me since  when we started her on vivelle 0.1 mg and nightly prometrium 100mg.  Did that and it really helped her v.m sx, both daytime and night time, and overall her sleep.  However then when didn't f/up the Rx lapsed and she's been without it again.  Would like to restart as did help and no s.e she recalls.    She o/w sees Dr. Garcia for her PCP and he manages her HTN.  She has been on 20mg lisinopril for awhile and tolerates it well but in the office is frequently suboptimally controlled and in the 130/80s range.  Have told her to check at home more often and then discuss with him if med adjustment or change or add is appropriate.    Also has post ablative hypothyroidism after having graves and sees Dr. Rinaldi  Saw him recently and he did her lipids and TFTs and the latter was normal but LDL was 143  She has pending labs in epic from Dr. Garcia but since had some of them with [facility] those weren't released.  Encouraged her to f/up with benedict on all the things unrelated to her thyroid so isn't getting disjointed care or missed labs, etc and she will do that    Had her mammo in July and it was normal.    Had her c.s about 18 months ago and that was as well.      GYNECOLOGIC HISTORY:    No LMP recorded (lmp unknown). Patient is postmenopausal.  She  reports that she has never smoked. She has never used smokeless tobacco.  Patient is sexually active.    STD testing offered?  Declined  Last PHQ-9 score on record =       2022     3:23 PM   PHQ-9 SCORE   PHQ-9 Total Score 0     Last GAD7 score on record =       2022     3:23 PM   JACQUELINE-7 SCORE   Total Score 0     Alcohol Score = NA    HEALTH MAINTENANCE:  Cholesterol: was just done with  endocrinology and they are sending results to primary care provider  Last Mammo:  7/3/24 , Result: Normal, Next Mammo:  2025  Pap:   Lab Results   Component Value Date    GYNINTERP  2022     Negative for Intraepithelial Lesion or Malignancy (NILM), NEG-HPV    PAP NIL 2017     Colonoscopy:  23, Result: Normal, Next Colonoscopy: 10 years.  Dexa:  NA  Health maintenance reviewed. Immunizations reviewed, patient declines.      HISTORY:  OB History    Para Term  AB Living   4 3 3 0 1 3   SAB IAB Ectopic Multiple Live Births   1 0 0 0 3      # Outcome Date GA Lbr Sammy/2nd Weight Sex Type Anes PTL Lv   4 Term 01/01/10    M       3 Term 06    F       2 Term 04    F       1 SAB                Patient Active Problem List   Diagnosis    Postablative hypothyroidism    Fibrocystic changes of left breast    Hypertension, essential    Vasomotor symptoms due to menopause    Post-menopause on HRT (hormone replacement therapy)     Past Surgical History:   Procedure Laterality Date    breast lump removal      COLONOSCOPY N/A 2023    Procedure: COLONOSCOPY;  Surgeon: Ricki Mcdowell MD;  Location:  GI    DILATION AND CURETTAGE  2011    x2      Social History     Tobacco Use    Smoking status: Never    Smokeless tobacco: Never   Substance Use Topics    Alcohol use: Yes     Comment: 0-2 per day      Problem (# of Occurrences) Relation (Name,Age of Onset)    Dementia (1) Mother    Heart Disease (1) Father    Hypertension (4) Mother, Father, Sister, Brother    Hyperlipidemia (2) Mother, Father              Current Outpatient Medications   Medication Sig Dispense Refill    estradiol (VIVELLE-DOT) 0.1 MG/24HR bi-weekly patch Place 1 patch onto the skin twice a week.      Fluticasone Propionate (FLONASE ALLERGY RELIEF NA) Spray in nostril daily as needed      levothyroxine (SYNTHROID/LEVOTHROID) 112 MCG tablet Take 1 tablet by mouth daily at 2 pm      lisinopril  "(ZESTRIL) 20 MG tablet Take 1 tablet (20 mg) by mouth daily 90 tablet 3    progesterone (PROMETRIUM) 100 MG capsule Take 1 capsule (100 mg) by mouth daily.       No current facility-administered medications for this visit.     Allergies   Allergen Reactions    Amoxicillin Hives    Clindamycin Hives    Hydrochlorothiazide      Cramping    Phenylephrine-Dm-Gg-Apap Rash       Past medical, surgical, social and family histories were reviewed and updated in EPIC.    EXAM:  /82   Ht 1.651 m (5' 5\")   Wt 58.5 kg (129 lb)   LMP  (LMP Unknown)   BMI 21.47 kg/m     BMI: Body mass index is 21.47 kg/m .    PHYSICAL EXAM:  Constitutional:   Appearance: Well nourished, well developed, alert, in no acute distress  Neck:  Lymph Nodes:  No lymphadenopathy present    Thyroid:  Gland size normal, nontender, no nodules or masses present  on palpation  Chest:  Respiratory Effort:  Breathing unlabored, CTA B  Cardiovascular:    Heart: Auscultation:  Regular rate, normal rhythm, no murmurs present  Breasts: Axillary Lymph Nodes:  No lymphadenopathy present. and No nodularity, asymmetry or nipple discharge bilaterally.  Gastrointestinal:   Abdominal Examination:  Abdomen nontender to palpation, tone normal without rigidity or guarding, no masses present, umbilicus without lesions   Liver and Spleen:  No hepatomegaly present, liver nontender to palpation    Hernias:  No hernias present  Lymphatic: Lymph Nodes:  No other lymphadenopathy present  Skin:  General Inspection:  No rashes present, no lesions present, no areas of  discoloration  Neurologic:    Mental Status:  Oriented X3.  Normal strength and tone, sensory exam                grossly normal, mentation intact and speech normal.    Psychiatric:   Mentation appears normal and affect normal/bright.         Pelvic Exam:  External Genitalia:     Normal appearance for age, no discharge present, no tenderness present, no inflammatory lesions present, color normal  Vagina:  "    Normal vaginal vault without central or paravaginal defects, no discharge present, no inflammatory lesions present, no masses present  Bladder:     Nontender to palpation  Urethra:   Urethral Body:  Urethra palpation normal, urethra structural support normal   Urethral Meatus:  No erythema or lesions present  Cervix:     Appearance healthy, no lesions present, nontender to palpation, no bleeding present  Uterus:     Uterus: firm, normal sized and nontender, midplane in position.   Adnexa:     No adnexal tenderness present, no adnexal masses present  Perineum:     Perineum within normal limits, no evidence of trauma, no rashes or skin lesions present  Anus:     Anus within normal limits, no hemorrhoids present  Inguinal Lymph Nodes:     No lymphadenopathy present  Pubic Hair:     Normal pubic hair distribution for age  Genitalia and Groin:     No rashes present, no lesions present, no areas of discoloration, no masses present    COUNSELING:   Reviewed preventive health counseling, as reflected in patient instructions  Special attention given to:        Immunizations  Declined: Covid-19, Influenza, and Zoster due to Conscientious objector             (Bea)menopause management    BMI: Body mass index is 21.47 kg/m .      ASSESSMENT:  54 year old female with satisfactory annual exam.    ICD-10-CM    1. Encounter for gynecological examination without abnormal finding  Z01.419       2. Vasomotor symptoms due to menopause  N95.1 estradiol (VIVELLE-DOT) 0.1 MG/24HR bi-weekly patch     progesterone (PROMETRIUM) 100 MG capsule      3. Postablative hypothyroidism  E89.0       4. Post-menopause on HRT (hormone replacement therapy)  Z79.890           PLAN:  Pap is UTD for 3 more years   Can follow routine ASCCP guidelines     Mammo was done about a month ago and normal so will try to sync up to be at time of her annual with me next year    Defer labs and mgmt of her HTN or or hypothyroidism to her PCP and endocrinologist but  did encourage her to more frequently check BPs, both morning and evening, when outside of the office to see if truly trend is suboptimal control vs just white coat worsening.    Additional health issues addressed at today's visit include:    Patient's v.m and perimenopausal sx were improved by HRT and just let Rx lapse as overdue for appointment.  Appears that either PCP or endo may have refilled her since then, but unclear if that's the case.  If actually does need refills from me can let us know and have pharmacy send refill request  Reviewed HRT and the pros/cons/risks and vivelle 0.1 mg with 100mg prometrium, and having no BTB, is working great and can continue on it.     Hawa Joiner MD

## 2024-09-11 ENCOUNTER — OFFICE VISIT (OUTPATIENT)
Dept: OBGYN | Facility: CLINIC | Age: 54
End: 2024-09-11
Payer: COMMERCIAL

## 2024-09-11 VITALS
WEIGHT: 129 LBS | BODY MASS INDEX: 21.49 KG/M2 | DIASTOLIC BLOOD PRESSURE: 82 MMHG | SYSTOLIC BLOOD PRESSURE: 132 MMHG | HEIGHT: 65 IN

## 2024-09-11 DIAGNOSIS — Z79.890 POST-MENOPAUSE ON HRT (HORMONE REPLACEMENT THERAPY): ICD-10-CM

## 2024-09-11 DIAGNOSIS — E89.0 POSTABLATIVE HYPOTHYROIDISM: ICD-10-CM

## 2024-09-11 DIAGNOSIS — Z01.419 ENCOUNTER FOR GYNECOLOGICAL EXAMINATION WITHOUT ABNORMAL FINDING: Primary | ICD-10-CM

## 2024-09-11 DIAGNOSIS — N95.1 VASOMOTOR SYMPTOMS DUE TO MENOPAUSE: ICD-10-CM

## 2024-09-11 PROCEDURE — 99396 PREV VISIT EST AGE 40-64: CPT | Performed by: OBSTETRICS & GYNECOLOGY

## 2024-09-11 RX ORDER — PROGESTERONE 100 MG/1
100 CAPSULE ORAL DAILY
COMMUNITY
Start: 2024-09-11

## 2024-09-11 RX ORDER — ESTRADIOL 0.1 MG/D
1 FILM, EXTENDED RELEASE TRANSDERMAL
COMMUNITY
Start: 2024-09-12

## 2024-10-02 PROBLEM — E89.0 POSTABLATIVE HYPOTHYROIDISM: Status: ACTIVE | Noted: 2017-08-22

## 2024-10-02 PROBLEM — N95.1 VASOMOTOR SYMPTOMS DUE TO MENOPAUSE: Status: ACTIVE | Noted: 2024-10-02

## 2024-10-02 PROBLEM — Z79.890 POST-MENOPAUSE ON HRT (HORMONE REPLACEMENT THERAPY): Status: ACTIVE | Noted: 2024-10-02

## 2025-03-19 DIAGNOSIS — I10 ESSENTIAL HYPERTENSION: ICD-10-CM

## 2025-03-19 RX ORDER — LISINOPRIL 20 MG/1
20 TABLET ORAL DAILY
Qty: 90 TABLET | Refills: 3 | Status: SHIPPED | OUTPATIENT
Start: 2025-03-19

## 2025-07-17 ENCOUNTER — MYC REFILL (OUTPATIENT)
Dept: FAMILY MEDICINE | Facility: CLINIC | Age: 55
End: 2025-07-17
Payer: COMMERCIAL

## 2025-07-17 DIAGNOSIS — I10 ESSENTIAL HYPERTENSION: ICD-10-CM

## 2025-07-17 RX ORDER — LISINOPRIL 20 MG/1
20 TABLET ORAL DAILY
Qty: 90 TABLET | Refills: 1 | Status: SHIPPED | OUTPATIENT
Start: 2025-07-17

## 2025-07-24 ENCOUNTER — OFFICE VISIT (OUTPATIENT)
Dept: FAMILY MEDICINE | Facility: CLINIC | Age: 55
End: 2025-07-24
Payer: COMMERCIAL

## 2025-07-24 VITALS
TEMPERATURE: 98 F | OXYGEN SATURATION: 99 % | HEIGHT: 65 IN | RESPIRATION RATE: 20 BRPM | SYSTOLIC BLOOD PRESSURE: 133 MMHG | WEIGHT: 128 LBS | DIASTOLIC BLOOD PRESSURE: 86 MMHG | HEART RATE: 89 BPM | BODY MASS INDEX: 21.33 KG/M2

## 2025-07-24 DIAGNOSIS — E89.0 POSTABLATIVE HYPOTHYROIDISM: ICD-10-CM

## 2025-07-24 DIAGNOSIS — Z13.1 SCREENING FOR DIABETES MELLITUS: ICD-10-CM

## 2025-07-24 DIAGNOSIS — I10 ESSENTIAL HYPERTENSION: ICD-10-CM

## 2025-07-24 DIAGNOSIS — Z12.31 VISIT FOR SCREENING MAMMOGRAM: ICD-10-CM

## 2025-07-24 DIAGNOSIS — Z11.59 NEED FOR HEPATITIS C SCREENING TEST: ICD-10-CM

## 2025-07-24 DIAGNOSIS — Z11.4 SCREENING FOR HIV (HUMAN IMMUNODEFICIENCY VIRUS): ICD-10-CM

## 2025-07-24 DIAGNOSIS — I10 HYPERTENSION, ESSENTIAL: ICD-10-CM

## 2025-07-24 DIAGNOSIS — Z00.00 ROUTINE GENERAL MEDICAL EXAMINATION AT A HEALTH CARE FACILITY: Primary | ICD-10-CM

## 2025-07-24 LAB
ERYTHROCYTE [DISTWIDTH] IN BLOOD BY AUTOMATED COUNT: 11.3 % (ref 10–15)
EST. AVERAGE GLUCOSE BLD GHB EST-MCNC: 88 MG/DL
HBA1C MFR BLD: 4.7 % (ref 0–5.6)
HCT VFR BLD AUTO: 40.4 % (ref 35–47)
HGB BLD-MCNC: 13.6 G/DL (ref 11.7–15.7)
MCH RBC QN AUTO: 33 PG (ref 26.5–33)
MCHC RBC AUTO-ENTMCNC: 33.7 G/DL (ref 31.5–36.5)
MCV RBC AUTO: 98 FL (ref 78–100)
PLATELET # BLD AUTO: 242 10E3/UL (ref 150–450)
RBC # BLD AUTO: 4.12 10E6/UL (ref 3.8–5.2)
WBC # BLD AUTO: 3.1 10E3/UL (ref 4–11)

## 2025-07-24 RX ORDER — LISINOPRIL 20 MG/1
20 TABLET ORAL DAILY
Qty: 90 TABLET | Refills: 3 | Status: SHIPPED | OUTPATIENT
Start: 2025-07-24

## 2025-07-24 SDOH — HEALTH STABILITY: PHYSICAL HEALTH: ON AVERAGE, HOW MANY MINUTES DO YOU ENGAGE IN EXERCISE AT THIS LEVEL?: 60 MIN

## 2025-07-24 SDOH — HEALTH STABILITY: PHYSICAL HEALTH: ON AVERAGE, HOW MANY DAYS PER WEEK DO YOU ENGAGE IN MODERATE TO STRENUOUS EXERCISE (LIKE A BRISK WALK)?: 7 DAYS

## 2025-07-24 ASSESSMENT — SOCIAL DETERMINANTS OF HEALTH (SDOH): HOW OFTEN DO YOU GET TOGETHER WITH FRIENDS OR RELATIVES?: ONCE A WEEK

## 2025-07-24 ASSESSMENT — PAIN SCALES - GENERAL: PAINLEVEL_OUTOF10: NO PAIN (0)

## 2025-07-24 NOTE — PROGRESS NOTES
Preventive Care Visit  [facility]  Pankaj Garcia MD, Internal Medicine  Jul 24, 2025      Assessment & Plan     Routine general medical examination at a health care facility    - Comprehensive metabolic panel; Future  - CBC with platelets; Future  - Lipid panel reflex to direct LDL Fasting; Future  - HEMOGLOBIN A1C; Future  - Comprehensive metabolic panel  - CBC with platelets  - Lipid panel reflex to direct LDL Fasting  - HEMOGLOBIN A1C    Hypertension, essential  Home blood pressure readings are reported to be in the 1 teens to 120s systolic over 70s  Blood pressure in clinic today is just above our goal of that less than 130/80  However, given her good blood pressure readings at home, I do not think any changes need to be made to her antihypertensive regimen which consist of lisinopril monotherapy  Continue lisinopril for now    Postablative hypothyroidism  Continue follow-up with Dr. Rinaldi from endocrinology who checks her TSH and prescribes her levothyroxine    Screening for diabetes mellitus      Screening for HIV (human immunodeficiency virus)    - HIV Antigen Antibody Combo; Future  - HIV Antigen Antibody Combo    Need for hepatitis C screening test    - Hepatitis C Screen Reflex to HCV RNA Quant and Genotype; Future  - Hepatitis C Screen Reflex to HCV RNA Quant and Genotype    Visit for screening mammogram  This has been set up for September through her GYN clinic according to her report    Essential hypertension    - lisinopril (ZESTRIL) 20 MG tablet; Take 1 tablet (20 mg) by mouth daily.  Patient has been advised of split billing requirements and indicates understanding: Yes    Counseling  Appropriate preventive services were addressed with this patient via screening, questionnaire, or discussion as appropriate for fall prevention, nutrition, physical activity, Tobacco-use cessation, social engagement, weight loss and cognition.  Checklist reviewing preventive services available has  been given to the patient.  Reviewed patient's diet, addressing concerns and/or questions.   Reviewed preventive health counseling, as reflected in patient instructions  Special attention given to:        Immunizations  She declined my recommendation for COVID shot, PCV 20, Shingrix series today    Follow-up    Follow-up Visit   Expected date:  Jul 24, 2026 (Approximate)      Follow Up Appointment Details:     Follow-up with whom?: PCP    Follow-Up for what?: Adult Preventive    How?: In Person                 Subjective   Ronda is a 55 year old, presenting for the following:  Physical (Patient is here for annual preventative visit.)        7/24/2025     4:14 PM   Additional Questions   Roomed by Dion CASH MA   Accompanied by Self          HPI           Advance Care Planning    Discussed advance care planning with patient; informed AVS has link to [facility].        7/24/2025   General Health   How would you rate your overall physical health? Excellent   Feel stress (tense, anxious, or unable to sleep) Not at all         7/24/2025   Nutrition   Three or more servings of calcium each day? Yes   Diet: Regular (no restrictions)   How many servings of fruit and vegetables per day? (!) 2-3   How many sweetened beverages each day? 0-1         7/24/2025   Exercise   Days per week of moderate/strenous exercise 7 days   Average minutes spent exercising at this level 60 min         7/24/2025   Social Factors   Frequency of gathering with friends or relatives Once a week   Worry food won't last until get money to buy more No   Food not last or not have enough money for food? No   Do you have housing? (Housing is defined as stable permanent housing and does not include staying outside in a car, in a tent, in an abandoned building, in an overnight shelter, or couch-surfing.) No   Are you worried about losing your housing? No   Lack of transportation? No   Unable to get utilities (heat,electricity)? No   Want help with  housing or utility concern? No   (!) HOUSING CONCERN PRESENT      7/24/2025   Fall Risk   Fallen 2 or more times in the past year? No   Trouble with walking or balance? No          7/24/2025   Dental   Dentist two times every year? Yes         Today's PHQ-2 Score:       7/24/2025     3:24 PM   PHQ-2 ( 1999 Pf[employer])   Q1: Little interest or pleasure in doing things 0   Q2: Feeling down, depressed or hopeless 0   PHQ-2 Score 0    Q1: Little interest or pleasure in doing things Not at all   Q2: Feeling down, depressed or hopeless Not at all   PHQ-2 Score 0       Patient-reported           7/24/2025   Substance Use   Alcohol more than 3/day or more than 7/wk No   Do you use any other substances recreationally? No     Social History     Tobacco Use    Smoking status: Never    Smokeless tobacco: Never   Vaping Use    Vaping status: Never Used   Substance Use Topics    Alcohol use: Yes     Comment: 2-4 a week    Drug use: No           7/3/2024   LAST FHS-7 RESULTS   1st degree relative breast or ovarian cancer No   Any relative bilateral breast cancer No   Any male have breast cancer No   Any ONE woman have BOTH breast AND ovarian cancer No   Any woman with breast cancer before 50yrs No   2 or more relatives with breast AND/OR ovarian cancer No   2 or more relatives with breast AND/OR bowel cancer No        She has a mammogram scheduled in September through her OB/GYN provider          7/24/2025   One time HIV Screening   Previous HIV test? No         7/24/2025   STI Screening   New sexual partner(s) since last STI/HIV test? No           Latest Ref Rng & Units 11/30/2022     3:54 PM 11/29/2017     9:34 AM 11/29/2017     9:09 AM   PAP / HPV   PAP  Negative for Intraepithelial Lesion or Malignancy (NILM)      PAP (Historical)    NIL    HPV 16 DNA Negative Negative  Negative     HPV 18 DNA Negative Negative  Negative     Other HR HPV Negative Negative  Negative       ASCVD Risk   The ASCVD Risk score (Aguilar GIBSON, et  al., 2019) failed to calculate for the following reasons:    The valid HDL cholesterol range is 20 to 100 mg/dL           Reviewed and updated as needed this visit by Provider                    Past Medical History:   Diagnosis Date    Fibrocystic changes of left breast 11/29/2017    Hypertension     Thyroid disease     has Graves Disease, sees Dr Rinaldi [facility]    Vasomotor symptoms due to menopause 10/02/2024     Past Surgical History:   Procedure Laterality Date    breast lump removal  1997    COLONOSCOPY N/A 2/28/2023    Procedure: COLONOSCOPY;  Surgeon: Ricki Mcdowell MD;  Location:  GI    DILATION AND CURETTAGE  2011    x2     BP Readings from Last 3 Encounters:   07/24/25 133/86   09/11/24 132/82   03/11/24 118/77    Wt Readings from Last 3 Encounters:   07/24/25 58.1 kg (128 lb)   09/11/24 58.5 kg (129 lb)   03/11/24 58.5 kg (128 lb 14.4 oz)                  Patient Active Problem List   Diagnosis    Postablative hypothyroidism    Fibrocystic changes of left breast    Hypertension, essential    Vasomotor symptoms due to menopause    Post-menopause on HRT (hormone replacement therapy)     Past Surgical History:   Procedure Laterality Date    breast lump removal  1997    COLONOSCOPY N/A 2/28/2023    Procedure: COLONOSCOPY;  Surgeon: Ricki Mcdowell MD;  Location:  GI    DILATION AND CURETTAGE  2011    x2       Social History     Tobacco Use    Smoking status: Never    Smokeless tobacco: Never   Substance Use Topics    Alcohol use: Yes     Comment: 2-4 a week     Family History   Problem Relation Age of Onset    Hyperlipidemia Mother     Hypertension Mother     Dementia Mother     Hyperlipidemia Father     Hypertension Father     Heart Disease Father     Hypertension Sister     Hypertension Brother          Current Outpatient Medications   Medication Sig Dispense Refill    lisinopril (ZESTRIL) 20 MG tablet Take 1 tablet (20 mg) by mouth daily. 90 tablet 3    estradiol  "(VIVELLE-DOT) 0.1 MG/24HR bi-weekly patch Place 1 patch onto the skin twice a week.      Fluticasone Propionate (FLONASE ALLERGY RELIEF NA) Spray in nostril daily as needed      levothyroxine (SYNTHROID/LEVOTHROID) 112 MCG tablet Take 1 tablet by mouth daily at 2 pm      progesterone (PROMETRIUM) 100 MG capsule Take 1 capsule (100 mg) by mouth daily.         A 10 organ systems ROS is negative other than any pertinent positives or negatives previously stated.      Objective    Exam  /86 (BP Location: Left arm, Patient Position: Sitting, Cuff Size: Adult Regular)   Pulse 89   Temp 98  F (36.7  C) (Temporal)   Resp 20   Ht 1.638 m (5' 4.5\")   Wt 58.1 kg (128 lb)   LMP  (LMP Unknown)   SpO2 99%   BMI 21.63 kg/m     Estimated body mass index is 21.63 kg/m  as calculated from the following:    Height as of this encounter: 1.638 m (5' 4.5\").    Weight as of this encounter: 58.1 kg (128 lb).    Physical Exam  GENERAL: alert and no distress  EYES: Eyes grossly normal to inspection, PERRL and conjunctivae and sclerae normal  HENT: ear canals and TM's normal, nose and mouth without ulcers or lesions  NECK: no adenopathy, no asymmetry, masses, or scars  RESP: lungs clear to auscultation - no rales, rhonchi or wheezes  CV: regular rate and rhythm, normal S1 S2, no S3 or S4, no murmur, click or rub, no peripheral edema  ABDOMEN: soft, nontender, no hepatosplenomegaly, no masses and bowel sounds normal  MS: no gross musculoskeletal defects noted, no edema  SKIN: no suspicious lesions or rashes  NEURO: Normal strength and tone, mentation intact and speech normal  PSYCH: mentation appears normal, affect normal/bright        Signed Electronically by: Pankaj Garcia MD    "

## 2025-07-24 NOTE — COMMUNITY RESOURCES LIST (ENGLISH)
Housing  [facility]   Program Provider: [facility]  Program Website : [email or web address].[email or web address]  Next Steps: Go to [email or web address]    Program Locations:   Address:  [address] [place] [id number]   Distance:  7.63 mi   Office Phone Number: [phone]    Hours:   Monday: 8:00 AM - 5:00 PM   Tuesday: 8:00 AM - 5:00 PM   Wednesday: 8:00 AM - 5:00 PM   Thursday: 8:00 AM - 5:00 PM   Friday: 8:00 AM - 5:00 PM   Saturday: CLOSED   Sunday: CLOSED     Affordable Housing Online   Program Provider: [employer]  Program Website : [email or web address]  Next Steps: Go to [email or web address]    Program Locations:   Address:  [address] [place] [address]   Distance:  1020.08 mi     Hours:   Monday: 8:00 AM - 5:00 PM   Tuesday: 8:00 AM - 5:00 PM   Wednesday: 8:00 AM - 5:00 PM   Thursday: 8:00 AM - 5:00 PM   Friday: 8:00 AM - 5:00 PM   Saturday: CLOSED   Sunday: CLOSED

## 2025-07-24 NOTE — PATIENT INSTRUCTIONS
Patient Education   Preventive Care Advice   This is general advice given by our system to help you stay healthy. However, your care team may have specific advice just for you. Please talk to your care team about your preventive care needs.  Nutrition  Eat 5 or more servings of fruits and vegetables each day.  Try wheat bread, brown rice and whole grain pasta (instead of white bread, rice, and pasta).  Get enough calcium and vitamin D. Check the label on foods and aim for 100% of the RDA (recommended daily allowance).  Lifestyle  Exercise at least 150 minutes each week  (30 minutes a day, 5 days a week).  Do muscle strengthening activities 2 days a week. These help control your weight and prevent disease.  No smoking.  Wear sunscreen to prevent skin cancer.  Have a dental exam and cleaning every 6 months.  Yearly exams  See your health care team every year to talk about:  Any changes in your health.  Any medicines your care team has prescribed.  Preventive care, family planning, and ways to prevent chronic diseases.  Shots (vaccines)   HPV shots (up to age 26), if you've never had them before.  Hepatitis B shots (up to age 59), if you've never had them before.  COVID-19 shot: Get this shot when it's due.  Flu shot: Get a flu shot every year.  Tetanus shot: Get a tetanus shot every 10 years.  Pneumococcal, hepatitis A, and RSV shots: Ask your care team if you need these based on your risk.  Shingles shot (for age 50 and up)  General health tests  Diabetes screening:  Starting at age 35, Get screened for diabetes at least every 3 years.  If you are younger than age 35, ask your care team if you should be screened for diabetes.  Cholesterol test: At age 39, start having a cholesterol test every 5 years, or more often if advised.  Bone density scan (DEXA): At age 50, ask your care team if you should have this scan for osteoporosis (brittle bones).  Hepatitis C: Get tested at least once in your life.  STIs (sexually  transmitted infections)  Before age 24: Ask your care team if you should be screened for STIs.  After age 24: Get screened for STIs if you're at risk. You are at risk for STIs (including HIV) if:  You are sexually active with more than one person.  You don't use condoms every time.  You or a partner was diagnosed with a sexually transmitted infection.  If you are at risk for HIV, ask about PrEP medicine to prevent HIV.  Get tested for HIV at least once in your life, whether you are at risk for HIV or not.  Cancer screening tests  Cervical cancer screening: If you have a cervix, begin getting regular cervical cancer screening tests starting at age 21.  Breast cancer scan (mammogram): If you've ever had breasts, begin having regular mammograms starting at age 40. This is a scan to check for breast cancer.  Colon cancer screening: It is important to start screening for colon cancer at age 45.  Have a colonoscopy test every 10 years (or more often if you're at risk) Or, ask your provider about stool tests like a FIT test every year or Cologuard test every 3 years.  To learn more about your testing options, visit:   .  For help making a decision, visit:   https://bit.ly/kp36969.  Prostate cancer screening test: If you have a prostate, ask your care team if a prostate cancer screening test (PSA) at age 55 is right for you.  Lung cancer screening: If you are a current or former smoker ages 50 to 80, ask your care team if ongoing lung cancer screenings are right for you.  For informational purposes only. Not to replace the advice of your health care provider. Copyright   2023 [employer]. All rights reserved. Clinically reviewed by the [facility] Transitions Program. [employer] 198730 - REV 01/24.

## (undated) RX ORDER — FENTANYL CITRATE 50 UG/ML
INJECTION, SOLUTION INTRAMUSCULAR; INTRAVENOUS
Status: DISPENSED
Start: 2023-02-28